# Patient Record
Sex: FEMALE | Race: WHITE | NOT HISPANIC OR LATINO | Employment: FULL TIME | URBAN - NONMETROPOLITAN AREA
[De-identification: names, ages, dates, MRNs, and addresses within clinical notes are randomized per-mention and may not be internally consistent; named-entity substitution may affect disease eponyms.]

---

## 2022-03-08 ENCOUNTER — NURSE TRIAGE (OUTPATIENT)
Dept: CALL CENTER | Facility: HOSPITAL | Age: 47
End: 2022-03-08

## 2022-03-08 NOTE — TELEPHONE ENCOUNTER
HUB    COVID three weeks ago, now palpitations occur during rest, none with exercise.  Just wanting to make an appointment for a check up.    Reason for Disposition  • [1] Skipped or extra beat(s) AND [2] occurs < 4 times / minute    Additional Information  • Negative: Passed out (i.e., lost consciousness, collapsed and was not responding)  • Negative: Shock suspected (e.g., cold/pale/clammy skin, too weak to stand, low BP, rapid pulse)  • Negative: Difficult to awaken or acting confused (e.g., disoriented, slurred speech)  • Negative: Visible sweat on face or sweat dripping down face  • Negative: Unable to walk, or can only walk with assistance (e.g., requires support)  • Negative: [1] Received SHOCK from implantable cardiac defibrillator AND [2] persisting symptoms (i.e., palpitations, lightheadedness)  • Negative: [1] Dizziness, lightheadedness, or weakness AND [2] heart beating very rapidly (e.g., > 140 / minute)  • Negative: [1] Dizziness, lightheadedness, or weakness AND [2] heart beating very slowly  (e.g., < 50 / minute)  • Negative: Sounds like a life-threatening emergency to the triager  • Negative: Chest pain  • Negative: Implantable Cardiac Defibrillator (ICD) or a pacemaker symptoms or questions  • Negative: Difficulty breathing  • Negative: Dizziness, lightheadedness, or weakness  • Negative: [1] Heart beating very rapidly (e.g., > 140 / minute) AND [2] present now  (Exception: during exercise)  • Negative: Heart beating very slowly (e.g., < 50 / minute)  (Exception: athlete and heart rate normal for caller)  • Negative: New or worsened shortness of breath with activity (dyspnea on exertion)  • Negative: Patient sounds very sick or weak to the triager  • Negative: [1] Heart beating very rapidly (e.g., > 140 / minute) AND [2] not present now  (Exception: during exercise)  • Negative: [1] Skipped or extra beat(s) AND [2] increases with exercise or exertion  • Negative: [1] Skipped or extra beat(s) AND  "[2] occurs 4 or more times per minute  • Negative: New or worsened ankle swelling  • Negative: History of heart disease  (i.e., heart attack, bypass surgery, angina, angioplasty, CHF) (Exception: brief heartbeat symptoms that went away and now feels well)  • Negative: Age > 60 years (Exception: brief heartbeat symptoms that went away and now feels well)  • Negative: Taking water pill (i.e., diuretic) or heart medication (e.g., digoxin)  • Negative: Wearing a \"Holter monitor\" or \"cardiac event monitor\"  • Negative: [1] Received SHOCK from implantable cardiac defibrillator AND [2] now feels well  • Negative: Heart rhythm alert (e.g., \"you have irregular heartbeat\") from personal wearable device (e.g., Apple Watch)  • Negative: History of hyperthyroidism or taking thyroid medication  • Negative: Substance use (drug use) or misuse, known or suspected  • Negative: [1] ADHD AND [2] taking stimulant medication  • Negative: [1] Palpitations AND [2] no improvement after using CARE ADVICE  • Negative: Problems with anxiety or stress  • Negative: Palpitations are a chronic symptom (recurrent or ongoing AND present > 4 weeks)    Answer Assessment - Initial Assessment Questions  1. DESCRIPTION: \"Please describe your heart rate or heartbeat that you are having\" (e.g., fast/slow, regular/irregular, skipped or extra beats, \"palpitations\")      Normal at this time; palpitations earlier today, maybe 3 times today; best described as a hiccup  2. ONSET: \"When did it start?\" (Minutes, hours or days)       First occurrence last week  3. DURATION: \"How long does it last\" (e.g., seconds, minutes, hours)      None at this time; lasts for just a few seconds when it occurs  4. PATTERN \"Does it come and go, or has it been constant since it started?\"  \"Does it get worse with exertion?\"   \"Are you feeling it now?\"      Come and go  5. TAP: \"Using your hand, can you tap out what you are feeling on a chair or table in front of you, so that I can " "hear?\" (Note: not all patients can do this)        normal  6. HEART RATE: \"Can you tell me your heart rate?\" \"How many beats in 15 seconds?\"  (Note: not all patients can do this)        78 and normal  7. RECURRENT SYMPTOM: \"Have you ever had this before?\" If Yes, ask: \"When was the last time?\" and \"What happened that time?\"       Last time was about an hour ago  8. CAUSE: \"What do you think is causing the palpitations?\"      covid three weeks ago  9. CARDIAC HISTORY: \"Do you have any history of heart disease?\" (e.g., heart attack, angina, bypass surgery, angioplasty, arrhythmia)       no  10. OTHER SYMPTOMS: \"Do you have any other symptoms?\" (e.g., dizziness, chest pain, sweating, difficulty breathing)        no  11. PREGNANCY: \"Is there any chance you are pregnant?\" \"When was your last menstrual period?\"        na    Protocols used: HEART RATE AND HEARTBEAT QUESTIONS-ADULT-AH      "

## 2022-03-10 ENCOUNTER — OFFICE VISIT (OUTPATIENT)
Dept: FAMILY MEDICINE CLINIC | Age: 47
End: 2022-03-10

## 2022-03-10 VITALS
SYSTOLIC BLOOD PRESSURE: 126 MMHG | HEART RATE: 81 BPM | DIASTOLIC BLOOD PRESSURE: 66 MMHG | TEMPERATURE: 98.7 F | WEIGHT: 197 LBS

## 2022-03-10 DIAGNOSIS — R00.2 PALPITATIONS: Primary | ICD-10-CM

## 2022-03-10 DIAGNOSIS — Z13.220 LIPID SCREENING: ICD-10-CM

## 2022-03-10 DIAGNOSIS — Z86.16 HISTORY OF COVID-19: ICD-10-CM

## 2022-03-10 DIAGNOSIS — N92.0 MENORRHAGIA WITH REGULAR CYCLE: ICD-10-CM

## 2022-03-10 DIAGNOSIS — Z11.59 ENCOUNTER FOR SCREENING FOR OTHER VIRAL DISEASES: ICD-10-CM

## 2022-03-10 PROCEDURE — 93000 ELECTROCARDIOGRAM COMPLETE: CPT | Performed by: FAMILY MEDICINE

## 2022-03-10 PROCEDURE — 99204 OFFICE O/P NEW MOD 45 MIN: CPT | Performed by: NURSE PRACTITIONER

## 2022-03-10 RX ORDER — MULTIPLE VITAMINS W/ MINERALS TAB 9MG-400MCG
1 TAB ORAL DAILY
COMMUNITY

## 2022-03-10 RX ORDER — ALBUTEROL SULFATE 90 UG/1
2 AEROSOL, METERED RESPIRATORY (INHALATION) EVERY 4 HOURS PRN
Qty: 18 G | Refills: 1 | Status: SHIPPED | OUTPATIENT
Start: 2022-03-10

## 2022-03-10 NOTE — PROGRESS NOTES
ECG 12 Lead    Date/Time: 3/10/2022 9:49 AM  Performed by: Onesimo Mcgill MD  Authorized by: Harper Key APRN   Comparison: not compared with previous ECG   Previous ECG: no previous ECG available  Rhythm: sinus rhythm  Rate: normal  BPM: 69  Conduction: conduction normal  ST Segments: ST segments normal  T Waves: T waves normal  QRS axis: normal    Clinical impression: normal ECG

## 2022-03-10 NOTE — PROGRESS NOTES
Chief Complaint  Establish Care (Covid 3 weeks ago, having heart palpitations x 2 weeks)    Subjective          [facility] to [facility] care and discuss palpitations.         Family history of MI, DM and CVA.  The patient has never smoked. She drinks 4 or 5 drinks per week, but she does not drink at all some weeks. She denies using any illegal substances. She is  and has 2 children. 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     She is . She still has menstrual cycles. She states that she is not using any birth control, but her  had a vasectomy. She mentions she has a regular menstrual cycle and notes that the length between her cycles is becoming shorter to 22 to 26 days. She reports having heavy menstrual flow in the first 2 or 3 days of her menstrual cycle before it becomes lighter. She notes that she sometimes needs to change her tampon every half an hour. She adds she bleeds through an ultra tampon and pad within a couple of hours. She denies having dizziness and fatigue. She notes she already discussed about an endometrial ablation with gynecologist. She states that she is up to date on her mammogram.     She states that she had symptoms of COVID on 2022 and tested positive for COVID on the following day. She reports that she began to have episodes of heart palpitations in 2nd week.  She notes that she sometimes feels like having a strong pulse in her neck that gives her an urge to cough, but then she feels better. She states that she has been experiencing episodes of heart palpitation more frequently in the last couple of days, both at rest and with activity.She drinks 1 cup of coffee every day and does drink soft drinks occasionally.  The patient complains of having mild shortness of breath to the point that she needs to take a deep breath. She denies having an albuterol inhaler.      Review of  Systems     A review of systems was performed, and the pertinent positives are noted in the HPI.         Objective   Vital Signs:   /66 (BP Location: Right arm, Patient Position: Sitting, Cuff Size: Adult)   Pulse 81   Temp 98.7 °F (37.1 °C) (Oral)   Wt 89.4 kg (197 lb)       Physical Exam  Vitals reviewed.   Constitutional:       General: She is not in acute distress.     Appearance: Normal appearance. She is well-developed. She is not ill-appearing.   HENT:      Head: Normocephalic and atraumatic.   Eyes:      Conjunctiva/sclera: Conjunctivae normal.      Pupils: Pupils are equal, round, and reactive to light.   Neck:      Vascular: No carotid bruit.   Cardiovascular:      Rate and Rhythm: Normal rate and regular rhythm.      Heart sounds: Normal heart sounds. No murmur heard.  Pulmonary:      Effort: Pulmonary effort is normal. No respiratory distress.      Breath sounds: Normal breath sounds.   Skin:     General: Skin is warm and dry.   Neurological:      Mental Status: She is alert and oriented to person, place, and time.   Psychiatric:         Mood and Affect: Mood and affect normal.         Behavior: Behavior normal.         Thought Content: Thought content normal.         Judgment: Judgment normal.              Result Review :                Assessment and Plan    Diagnoses and all orders for this visit:    1. Palpitations (Primary)  Assessment & Plan:  Ordered Holter monitor. ECG normal .     Orders:  -     TSH Rfx On Abnormal To Free T4; Future  -     Comprehensive Metabolic Panel; Future  -     ECG 12 Lead  -     Holter Monitor - 48 Hour; Future    2. Menorrhagia with regular cycle  Assessment & Plan:  F/u with gynecology as scheduled. Will notify pt if anemic and treat if needed.     Orders:  -     CBC Auto Differential; Future    3. Encounter for screening for other viral diseases  Comments:  Will notify pt of results.   Orders:  -     Hepatitis C Antibody; Future    4. Lipid  screening  Comments:  Will notify pt of results and treat accordingly.   Orders:  -     Lipid Panel; Future    5. History of COVID-19  Comments:  Patient is still experiencing some mild shortness of air.  Albuterol as needed.  Potential side effects medication discussed.    Other orders  -     albuterol sulfate  (90 Base) MCG/ACT inhaler; Inhale 2 puffs Every 4 (Four) Hours As Needed for Wheezing.  Dispense: 18 g; Refill: 1      Patient to notify office with any acute concerns or issues.  Patient verbalizes understanding, agrees with plan of care and has no further questions upon discharge.     Please note that portions of this note were completed with a voice recognition program.        Follow Up    Return in about 1 year (around 3/10/2023) for Annual physical.  Patient was given instructions and counseling regarding her condition or for health maintenance advice. Please see specific information pulled into the AVS if appropriate.       This note has been created using Dragon [employer] and was completed in the EHR by Tonya Land.  KYMBERLY Faith

## 2022-03-18 ENCOUNTER — LAB (OUTPATIENT)
Dept: LAB | Facility: HOSPITAL | Age: 47
End: 2022-03-18

## 2022-03-18 DIAGNOSIS — Z11.59 ENCOUNTER FOR SCREENING FOR OTHER VIRAL DISEASES: ICD-10-CM

## 2022-03-18 DIAGNOSIS — R00.2 PALPITATIONS: ICD-10-CM

## 2022-03-18 DIAGNOSIS — N92.0 MENORRHAGIA WITH REGULAR CYCLE: ICD-10-CM

## 2022-03-18 DIAGNOSIS — Z13.220 LIPID SCREENING: ICD-10-CM

## 2022-03-18 LAB
ALBUMIN SERPL-MCNC: 4 G/DL (ref 3.5–5.2)
ALBUMIN/GLOB SERPL: 1.3 G/DL
ALP SERPL-CCNC: 72 U/L (ref 39–117)
ALT SERPL W P-5'-P-CCNC: 11 U/L (ref 1–33)
ANION GAP SERPL CALCULATED.3IONS-SCNC: 9.6 MMOL/L (ref 5–15)
AST SERPL-CCNC: 14 U/L (ref 1–32)
BASOPHILS # BLD AUTO: 0.03 10*3/MM3 (ref 0–0.2)
BASOPHILS NFR BLD AUTO: 0.5 % (ref 0–1.5)
BILIRUB SERPL-MCNC: 0.3 MG/DL (ref 0–1.2)
BUN SERPL-MCNC: 9 MG/DL (ref 6–20)
BUN/CREAT SERPL: 12.5 (ref 7–25)
CALCIUM SPEC-SCNC: 8.9 MG/DL (ref 8.6–10.5)
CHLORIDE SERPL-SCNC: 104 MMOL/L (ref 98–107)
CHOLEST SERPL-MCNC: 158 MG/DL (ref 0–200)
CO2 SERPL-SCNC: 25.4 MMOL/L (ref 22–29)
CREAT SERPL-MCNC: 0.72 MG/DL (ref 0.57–1)
DEPRECATED RDW RBC AUTO: 45.3 FL (ref 37–54)
EGFRCR SERPLBLD CKD-EPI 2021: 103.9 ML/MIN/1.73
EOSINOPHIL # BLD AUTO: 0.05 10*3/MM3 (ref 0–0.4)
EOSINOPHIL NFR BLD AUTO: 0.9 % (ref 0.3–6.2)
ERYTHROCYTE [DISTWIDTH] IN BLOOD BY AUTOMATED COUNT: 12.7 % (ref 12.3–15.4)
GLOBULIN UR ELPH-MCNC: 3 GM/DL
GLUCOSE SERPL-MCNC: 92 MG/DL (ref 65–99)
HCT VFR BLD AUTO: 37.6 % (ref 34–46.6)
HCV AB SER DONR QL: NORMAL
HDLC SERPL-MCNC: 40 MG/DL (ref 40–60)
HGB BLD-MCNC: 12.1 G/DL (ref 12–15.9)
IMM GRANULOCYTES # BLD AUTO: 0 10*3/MM3 (ref 0–0.05)
IMM GRANULOCYTES NFR BLD AUTO: 0 % (ref 0–0.5)
LDLC SERPL CALC-MCNC: 99 MG/DL (ref 0–100)
LDLC/HDLC SERPL: 2.45 {RATIO}
LYMPHOCYTES # BLD AUTO: 1.95 10*3/MM3 (ref 0.7–3.1)
LYMPHOCYTES NFR BLD AUTO: 34.1 % (ref 19.6–45.3)
MCH RBC QN AUTO: 30.7 PG (ref 26.6–33)
MCHC RBC AUTO-ENTMCNC: 32.2 G/DL (ref 31.5–35.7)
MCV RBC AUTO: 95.4 FL (ref 79–97)
MONOCYTES # BLD AUTO: 0.54 10*3/MM3 (ref 0.1–0.9)
MONOCYTES NFR BLD AUTO: 9.4 % (ref 5–12)
NEUTROPHILS NFR BLD AUTO: 3.15 10*3/MM3 (ref 1.7–7)
NEUTROPHILS NFR BLD AUTO: 55.1 % (ref 42.7–76)
PLATELET # BLD AUTO: 260 10*3/MM3 (ref 140–450)
PMV BLD AUTO: 10.7 FL (ref 6–12)
POTASSIUM SERPL-SCNC: 4.2 MMOL/L (ref 3.5–5.2)
PROT SERPL-MCNC: 7 G/DL (ref 6–8.5)
RBC # BLD AUTO: 3.94 10*6/MM3 (ref 3.77–5.28)
SODIUM SERPL-SCNC: 139 MMOL/L (ref 136–145)
TRIGL SERPL-MCNC: 100 MG/DL (ref 0–150)
TSH SERPL DL<=0.05 MIU/L-ACNC: 1.68 UIU/ML (ref 0.27–4.2)
VLDLC SERPL-MCNC: 19 MG/DL (ref 5–40)
WBC NRBC COR # BLD: 5.72 10*3/MM3 (ref 3.4–10.8)

## 2022-03-18 PROCEDURE — 86803 HEPATITIS C AB TEST: CPT

## 2022-03-18 PROCEDURE — 80061 LIPID PANEL: CPT

## 2022-03-18 PROCEDURE — 80050 GENERAL HEALTH PANEL: CPT

## 2022-03-18 PROCEDURE — 36415 COLL VENOUS BLD VENIPUNCTURE: CPT

## 2022-04-24 ENCOUNTER — HOSPITAL ENCOUNTER
Dept: HOSPITAL 49 - FER | Age: 47
LOS: 1 days | Discharge: HOME | End: 2022-04-25
Attending: INTERNAL MEDICINE | Admitting: INTERNAL MEDICINE
Payer: COMMERCIAL

## 2022-04-24 VITALS — BODY MASS INDEX: 30.65 KG/M2 | HEIGHT: 67.99 IN | WEIGHT: 202.25 LBS

## 2022-04-24 DIAGNOSIS — Z86.16: ICD-10-CM

## 2022-04-24 DIAGNOSIS — K52.9: Primary | ICD-10-CM

## 2022-04-24 DIAGNOSIS — Z72.89: ICD-10-CM

## 2022-04-24 LAB
ALBUMIN SERPL-MCNC: 4 G/DL (ref 3.4–5)
ALKALINE PHOSHATASE: 88 U/L (ref 46–116)
ALT SERPL-CCNC: 22 U/L (ref 14–59)
AST: 13 U/L (ref 15–37)
BACTERIA: (no result)
BASOPHIL: 0.1 % (ref 0–2)
BASOPHIL: 0.3 % (ref 0–2)
BILIRUBIN - TOTAL: 0.3 MG/DL (ref 0.2–1)
BILIRUBIN: NEGATIVE MG/DL
BLOOD: (no result) ERY/UL
BUN SERPL-MCNC: 14 MG/DL (ref 7–18)
BUN/CREAT RATIO (CALC): 19.2 RATIO
CHLORIDE: 99 MMOL/L (ref 98–107)
CLARITY UR: CLEAR
CO2 (BICARBONATE): 24 MMOL/L (ref 21–32)
COLOR: YELLOW
CREATININE: 0.73 MG/DL (ref 0.51–0.95)
EOSINOPHIL: 0 % (ref 0–5)
EOSINOPHIL: 0.1 % (ref 0–5)
GLOBULIN (CALCULATION): 4.2 G/DL
GLUCOSE (U): NORMAL MG/DL
GLUCOSE SERPL-MCNC: 131 MG/DL (ref 74–106)
HCT: 38.2 % (ref 37–47)
HCT: 39.6 % (ref 37–47)
HGB BLD-MCNC: 12.7 G/DL (ref 12.5–16)
HGB BLD-MCNC: 13.5 G/DL (ref 12.5–16)
LACTIC ACID: 3 MMOL/L (ref 0.4–1.9)
LEUKOCYTES: NEGATIVE LEU/UL
LIPASE: 80 U/L (ref 73–393)
LYMPHOCYTE: 16.4 % (ref 15–48)
LYMPHOCYTE: 7.3 % (ref 15–48)
MCH RBC QN AUTO: 31.1 PG (ref 25–31)
MCH RBC QN AUTO: 31.4 PG (ref 25–31)
MCHC RBC AUTO-ENTMCNC: 33.2 G/DL (ref 32–36)
MCHC RBC AUTO-ENTMCNC: 34.1 G/DL (ref 32–36)
MCV: 92.1 FL (ref 78–100)
MCV: 93.6 FL (ref 78–100)
MONOCYTE: 3.9 % (ref 0–12)
MONOCYTE: 7.3 % (ref 0–12)
MPV: 10.6 FL (ref 6–9.5)
MPV: 10.7 FL (ref 6–9.5)
NEUTROPHIL: 75.5 % (ref 41–80)
NEUTROPHIL: 88.1 % (ref 41–80)
NITRITE: NEGATIVE MG/DL
NRBC: 0
NRBC: 0
PLT: 298 K/UL (ref 150–400)
PLT: 328 K/UL (ref 150–400)
POTASSIUM: 3.5 MMOL/L (ref 3.5–5.1)
PROTEIN: NEGATIVE MG/DL
RBC MORPHOLOGY: NORMAL
RBC MORPHOLOGY: NORMAL
RBC: 4.08 M/UL (ref 4.2–5.4)
RBC: 4.3 M/UL (ref 4.2–5.4)
RDW: 13 % (ref 11.5–14)
RDW: 13.1 % (ref 11.5–14)
SPECIFIC GRAVITY: 1.01 (ref 1–1.03)
SQUAMOUS EPITHELIAL CELLS: (no result)
TOTAL PROTEIN: 8.2 G/DL (ref 6.4–8.2)
URINARY RBC: (no result)
URINARY WBC: (no result)
UROBILINOGEN: 0.2 MG/DL (ref 0.2–1)
WBC: 13.6 K/UL (ref 4–10.5)
WBC: 13.7 K/UL (ref 4–10.5)

## 2022-04-24 PROCEDURE — U0002 COVID-19 LAB TEST NON-CDC: HCPCS

## 2022-04-24 PROCEDURE — C9113 INJ PANTOPRAZOLE SODIUM, VIA: HCPCS

## 2022-04-24 PROCEDURE — G0378 HOSPITAL OBSERVATION PER HR: HCPCS

## 2022-04-25 LAB
ALBUMIN SERPL-MCNC: 3.2 G/DL (ref 3.4–5)
ALKALINE PHOSHATASE: 60 U/L (ref 46–116)
ALT SERPL-CCNC: 12 U/L (ref 14–59)
AST: 11 U/L (ref 15–37)
BASOPHIL: 0.2 % (ref 0–2)
BILIRUBIN - TOTAL: 0.4 MG/DL (ref 0.2–1)
BUN SERPL-MCNC: 7 MG/DL (ref 7–18)
BUN/CREAT RATIO (CALC): 11.9 RATIO
C-REACTIVE PROTEIN: < 0.2 MG/DL (ref ?–0.9)
CHLORIDE: 107 MMOL/L (ref 98–107)
CO2 (BICARBONATE): 23 MMOL/L (ref 21–32)
CREATININE: 0.59 MG/DL (ref 0.51–0.95)
EOSINOPHIL(M): 1 % (ref 0–5)
EOSINOPHIL: 0.3 % (ref 0–5)
GLOBULIN (CALCULATION): 3.1 G/DL
GLUCOSE SERPL-MCNC: 92 MG/DL (ref 74–106)
HCT: 36.9 % (ref 37–47)
HGB BLD-MCNC: 12.1 G/DL (ref 12.5–16)
INR PPP: 1.15 (ref 0.9–1.2)
LACTIC ACID: 0.6 MMOL/L (ref 0.4–1.9)
LYMPHOCYTE(M): 24 % (ref 15–48)
LYMPHOCYTE: 23.5 % (ref 15–48)
MCH RBC QN AUTO: 31 PG (ref 25–31)
MCHC RBC AUTO-ENTMCNC: 32.8 G/DL (ref 32–36)
MCV: 94.6 FL (ref 78–100)
MONOCYTE(M): 8 % (ref 0–12)
MONOCYTE: 8.3 % (ref 0–12)
MPV: 10.8 FL (ref 6–9.5)
NEUTROPHIL: 67.4 % (ref 41–80)
NEUTROPHILS(M): 66 % (ref 41–80)
NRBC: 0
PLATELET ESTIMATE: NORMAL
PLATELET MORPHOLOGY: NORMAL
PLT: 266 K/UL (ref 150–400)
POTASSIUM: 3.8 MMOL/L (ref 3.5–5.1)
PROTHROMBIN TIME: 14.1 SECONDS (ref 11.8–13.4)
RBC MORPHOLOGY: NORMAL
RBC: 3.9 M/UL (ref 4.2–5.4)
RDW: 13.6 % (ref 11.5–14)
TOTAL CELL COUNT: 100
TOTAL PROTEIN: 6.3 G/DL (ref 6.4–8.2)
VARIANT LYMPHOCYTE: 1
WBC: 9.6 K/UL (ref 4–10.5)

## 2022-06-17 ENCOUNTER — HOSPITAL ENCOUNTER (OUTPATIENT)
Dept: HOSPITAL 49 - FAS | Age: 47
Discharge: HOME | End: 2022-06-17
Attending: STUDENT IN AN ORGANIZED HEALTH CARE EDUCATION/TRAINING PROGRAM
Payer: COMMERCIAL

## 2022-06-17 VITALS — HEIGHT: 68 IN | WEIGHT: 196.23 LBS | BODY MASS INDEX: 29.74 KG/M2

## 2022-06-17 DIAGNOSIS — K52.9: Primary | ICD-10-CM

## 2022-09-02 ENCOUNTER — OFFICE VISIT (OUTPATIENT)
Dept: FAMILY MEDICINE CLINIC | Age: 47
End: 2022-09-02

## 2022-09-02 VITALS
SYSTOLIC BLOOD PRESSURE: 132 MMHG | HEIGHT: 70 IN | DIASTOLIC BLOOD PRESSURE: 83 MMHG | OXYGEN SATURATION: 100 % | WEIGHT: 199 LBS | TEMPERATURE: 98.1 F | HEART RATE: 64 BPM | BODY MASS INDEX: 28.49 KG/M2

## 2022-09-02 DIAGNOSIS — R00.2 PALPITATIONS: Primary | ICD-10-CM

## 2022-09-02 DIAGNOSIS — B07.9 WART OF HAND: ICD-10-CM

## 2022-09-02 PROCEDURE — 99213 OFFICE O/P EST LOW 20 MIN: CPT | Performed by: NURSE PRACTITIONER

## 2022-09-02 RX ORDER — BACILLUS COAGULANS/INULIN 1B-250 MG
CAPSULE ORAL EVERY OTHER DAY
COMMUNITY

## 2022-09-09 ENCOUNTER — PATIENT MESSAGE (OUTPATIENT)
Dept: FAMILY MEDICINE CLINIC | Age: 47
End: 2022-09-09

## 2022-09-09 NOTE — PROGRESS NOTES
"Chief Complaint  Palpitations (Having since feb after covid. Has worn a heart monitor and ekg abd taken inhaler but no improvements. Wants to be referred to cardio ), Shortness of Breath, and wart  (Wart on right hand 3rd finger wants wart removed )    Subjective          Onelia Chauhan presents to [facility]  Is here today for continued palpitations after having COVID as well as mildly soa. She has tried the albuterol inhaler and it does help some. Holter monitor and ecg were normal. Denies excessive caffeine use. They have still continued and she would like further evaluation. She also is c/o very small wart to her 3rd finger of right hand that she would like removed. She has had multiple warts in the past and wanted to do it before it got to large.     Objective   Vital Signs:   Vitals:    09/02/22 1149   BP: 132/83   BP Location: Left arm   Patient Position: Sitting   Cuff Size: Adult   Pulse: 64   Temp: 98.1 °F (36.7 °C)   TempSrc: Oral   SpO2: 100%   Weight: 90.3 kg (199 lb)   Height: 177.8 cm (70\")       Physical Exam  Vitals reviewed.   Constitutional:       General: She is not in acute distress.     Appearance: Normal appearance. She is well-developed.   HENT:      Head: Normocephalic and atraumatic.   Eyes:      Conjunctiva/sclera: Conjunctivae normal.      Pupils: Pupils are equal, round, and reactive to light.   Neck:      Vascular: No carotid bruit.   Cardiovascular:      Rate and Rhythm: Normal rate and regular rhythm.      Heart sounds: Normal heart sounds. No murmur heard.  Pulmonary:      Effort: Pulmonary effort is normal. No respiratory distress.      Breath sounds: Normal breath sounds.   Skin:     General: Skin is warm and dry.      Comments: Very small wart to lateral aspect of 3rd digit of right hand noted.    Neurological:      Mental Status: She is alert and oriented to person, place, and time.   Psychiatric:         Mood and Affect: Mood and affect " normal.         Behavior: Behavior normal.         Thought Content: Thought content normal.         Judgment: Judgment normal.          Result Review :       Cryotherapy, Skin Lesion    Date/Time: 9/9/2022 5:46 PM  Performed by: Harper Key APRN  Authorized by: Harper Key APRN   Preparation: Patient was prepped and draped in the usual sterile fashion.  Local anesthesia used: no    Anesthesia:  Local anesthesia used: no    Sedation:  Patient sedated: no    Patient tolerance: patient tolerated the procedure well with no immediate complications            Assessment and Plan    Diagnoses and all orders for this visit:    1. Palpitations (Primary)  -     Ambulatory Referral to Cardiology    2. Wart of hand  Comments:  cryotherapy tolerated well. Explained to pt that she may need to return for 2-3 treatments.     Other orders  -     Cryotherapy, Skin Lesion    Patient to notify office with any acute concerns or issues.  Patient verbalizes understanding, agrees with plan of care and has no further questions upon discharge.    Please note that portions of this note were completed with a voice recognition program.    Follow Up {  Wrapup  LOS  Follow-up  Instructions  Communications :23}   Return in about 3 months (around 12/2/2022) for Recheck.  Patient was given instructions and counseling regarding her condition or for health maintenance advice. Please see specific information pulled into the AVS if appropriate.

## 2022-09-14 ENCOUNTER — OFFICE VISIT (OUTPATIENT)
Dept: CARDIOLOGY | Facility: CLINIC | Age: 47
End: 2022-09-14

## 2022-09-14 VITALS
DIASTOLIC BLOOD PRESSURE: 59 MMHG | HEIGHT: 70 IN | SYSTOLIC BLOOD PRESSURE: 127 MMHG | WEIGHT: 196.8 LBS | OXYGEN SATURATION: 100 % | HEART RATE: 60 BPM | BODY MASS INDEX: 28.18 KG/M2

## 2022-09-14 DIAGNOSIS — Z72.0 TOBACCO ABUSE: ICD-10-CM

## 2022-09-14 DIAGNOSIS — R00.2 INTERMITTENT PALPITATIONS: Primary | ICD-10-CM

## 2022-09-14 PROCEDURE — 99203 OFFICE O/P NEW LOW 30 MIN: CPT | Performed by: INTERNAL MEDICINE

## 2022-09-14 NOTE — PROGRESS NOTES
Chief Complaint  Establish Care (Pt c/o palpitations when she is resting, SOB and chest tightness since February.)    Subjective            Onelia Chauhan presents to [facility]  History of Present Illness  Mrs. Chauhan is a new patient who was referred here by her PCP due to recurrent episodes of intermittent palpitations since February 2022.  She reports that she had COVID-19 infection early this year and has experienced numerous episodes of palpitations since that time.  These episodes tend to occur more frequently in the evening and often occur while she is resting at home.  No associated lightheadedness, chest pain/pressure, or nausea reported.  She has not experienced any episodes of palpitations while physically exerting herself.  She had a 24 hour Holter monitor in March 2022 that showed sinus rhythm with rare PVCs and PACs, but no significant abnormalities.  Her reported symptoms while wearing the monitor consistently correlated with normal sinus rhythm, and she states she did experience multiple episodes of her typical palpitations while wearing the Holter monitor.  Mrs. Chauhan cannot identify any triggers or precipitating factors for these episodes and states that they consistently resolve spontaneously within several minutes or less.  She does report some mild chronic exertional dyspnea, but states this symptom has improved since she was recently started on an inhaler by her PCP.  Her last ECG obtained back in March 2022 showed sinus rhythm and was normal.  Her BP was normal at 127/59 in the office today.  No orthopnea, PND, or peripheral edema reported.  She likewise does not report any family history of arrhythmias.      Past Medical History:   Diagnosis Date   • Asthma February 2022    Short of breath  after covid       No past surgical history on file.    Social History     Tobacco Use   • Smoking status: Current Every Day Smoker   • Smokeless tobacco: Never Used  "  Vaping Use   • Vaping Use: Never used   Substance Use Topics   • Alcohol use: Not Currently   • Drug use: Never       Family History   Problem Relation Age of Onset   • Diabetes Father    • Stroke Maternal Grandmother    • Heart attack Maternal Grandmother    • Stroke Paternal Grandmother    • Diabetes Paternal Grandmother    • Diabetes Paternal Grandfather         Current Outpatient Medications on File Prior to Visit   Medication Sig   • albuterol sulfate  (90 Base) MCG/ACT inhaler Inhale 2 puffs Every 4 (Four) Hours As Needed for Wheezing.   • Bacillus Coagulans-Inulin (Probiotic) 1-250 BILLION-MG capsule Take  by mouth.   • multivitamin with minerals tablet tablet Take 1 tablet by mouth Daily.     No current facility-administered medications on file prior to visit.       No Known Allergies    Review of Systems   Constitutional: Negative for activity change, chills, fever and unexpected weight gain.   HENT: Negative for hearing loss, nosebleeds and sore throat.    Eyes: Negative for pain and visual disturbance.   Respiratory: Positive for shortness of breath. Negative for cough and wheezing.    Cardiovascular: Positive for palpitations. Negative for chest pain and leg swelling.   Gastrointestinal: Negative for abdominal pain, blood in stool and vomiting.   Endocrine: Negative for cold intolerance and heat intolerance.   Genitourinary: Negative for dysuria and hematuria.   Musculoskeletal: Negative for joint swelling and myalgias.   Skin: Negative for color change, pallor and rash.   Neurological: Negative for tremors, syncope, weakness, light-headedness and headache.   Hematological: Negative for adenopathy. Does not bruise/bleed easily.        Objective     /59   Pulse 60   Ht 177.8 cm (70\")   Wt 89.3 kg (196 lb 12.8 oz)   SpO2 100%   BMI 28.24 kg/m²       Physical Exam  Constitutional:       General: She is not in acute distress.     Appearance: Normal appearance.   HENT:      Head: " Atraumatic.      Mouth/Throat:      Mouth: Mucous membranes are moist.      Pharynx: Oropharynx is clear. No oropharyngeal exudate.   Eyes:      General: No scleral icterus.     Conjunctiva/sclera: Conjunctivae normal.   Neck:      Vascular: No carotid bruit or JVD.   Cardiovascular:      Rate and Rhythm: Normal rate and regular rhythm.  No extrasystoles are present.     Chest Wall: PMI is not displaced.      Pulses:           Radial pulses are 2+ on the right side and 2+ on the left side.      Heart sounds: S1 normal and S2 normal. No murmur heard.    No friction rub. No gallop. No S3 sounds.   Pulmonary:      Effort: Pulmonary effort is normal. No tachypnea or respiratory distress.      Breath sounds: No decreased breath sounds, wheezing, rhonchi or rales.   Chest:      Chest wall: No tenderness.   Abdominal:      General: Bowel sounds are normal. There is no distension.      Palpations: Abdomen is soft. There is no mass.      Tenderness: There is no abdominal tenderness.   Musculoskeletal:         General: No swelling, tenderness or deformity.      Cervical back: Neck supple. No tenderness.      Right lower leg: No edema.      Left lower leg: No edema.   Skin:     General: Skin is warm and dry.      Coloration: Skin is not jaundiced.      Findings: No erythema or rash.      Nails: There is no clubbing.   Neurological:      General: No focal deficit present.      Mental Status: She is alert and oriented to person, place, and time.      Motor: No weakness.   Psychiatric:         Mood and Affect: Mood normal.         Behavior: Behavior normal.       Result Review :     The following data was reviewed by: Doc Page MD on 09/14/2022:    CMP    CMP 3/18/22   Glucose 92   BUN 9   Creatinine 0.72   Sodium 139   Potassium 4.2   Chloride 104   Calcium 8.9   Albumin 4.00   Total Bilirubin 0.3   Alkaline Phosphatase 72   AST (SGOT) 14   ALT (SGPT) 11           CBC    CBC 3/18/22   WBC 5.72   RBC 3.94   Hemoglobin  12.1   Hematocrit 37.6   MCV 95.4   MCH 30.7   MCHC 32.2   RDW 12.7   Platelets 260           Lipid Panel    Lipid Panel 3/18/22   Total Cholesterol 158   Triglycerides 100   HDL Cholesterol 40   VLDL Cholesterol 19   LDL Cholesterol  99   LDL/HDL Ratio 2.45           Holter Monitor 3/10/22:  · A normal monitor study.  · No correlation of arrhythmias with patient symptoms.  Study Findings:  Palpitations and shortness of breath were reported during the monitoring period. Palpitations had no correlations. The predominant rhythm noted during the testing period was sinus rhythm. Premature atrial contractions occured rarely. There was no evidence of atrial arrhythmias. There were no episodes of supraventricular tachycardia. Premature ventricular contractions occured rarely. There were no episodes of ventricular tachycardia. Sinoatrial node conduction was normal. No atrioventricular block noted.         Assessment and Plan      Diagnoses and all orders for this visit:    1. Intermittent palpitations (Primary)  -     Adult Transthoracic Echo Complete w/ Color, Spectral and Contrast if necessary per protocol; Future    2. Tobacco abuse    -Palpitations:  We we will obtain an echocardiogram to rule out significant structural heart disease, as above.  Her previous Holter monitor was unremarkable and her reported symptoms during that study consistently correlated with normal sinus rhythm.  I offered her a trial of low-dose beta-blocker therapy to see if it helps with her symptoms, but she prefers to avoid additional medications for now.      -Tobacco abuse:  Smoking cessation counseling was provided.        Follow Up     Follow up will be based on the echocardiogram results.    Patient was given instructions and counseling regarding her condition or for health maintenance advice. Please see specific information pulled into the AVS if appropriate.     Onelia Chauhan  reports that she has been smoking. She has never used  smokeless tobacco.  I have educated her on the risk of diseases from using tobacco products such as cancer, COPD and heart disease.     I advised her to quit and she is not ready to quit at this time.

## 2022-10-20 ENCOUNTER — LAB (OUTPATIENT)
Dept: LAB | Facility: HOSPITAL | Age: 47
End: 2022-10-20

## 2022-10-20 ENCOUNTER — OFFICE VISIT (OUTPATIENT)
Dept: FAMILY MEDICINE CLINIC | Age: 47
End: 2022-10-20

## 2022-10-20 VITALS
HEIGHT: 70 IN | DIASTOLIC BLOOD PRESSURE: 76 MMHG | WEIGHT: 199.6 LBS | SYSTOLIC BLOOD PRESSURE: 120 MMHG | BODY MASS INDEX: 28.58 KG/M2 | HEART RATE: 71 BPM | TEMPERATURE: 99.2 F

## 2022-10-20 DIAGNOSIS — Z01.818 PREOP TESTING: ICD-10-CM

## 2022-10-20 DIAGNOSIS — Z01.818 PREOP TESTING: Primary | ICD-10-CM

## 2022-10-20 LAB
ALBUMIN SERPL-MCNC: 4.6 G/DL (ref 3.5–5.2)
ALBUMIN/GLOB SERPL: 1.8 G/DL
ALP SERPL-CCNC: 88 U/L (ref 39–117)
ALT SERPL W P-5'-P-CCNC: 14 U/L (ref 1–33)
ANION GAP SERPL CALCULATED.3IONS-SCNC: 10.7 MMOL/L (ref 5–15)
AST SERPL-CCNC: 19 U/L (ref 1–32)
BASOPHILS # BLD AUTO: 0.02 10*3/MM3 (ref 0–0.2)
BASOPHILS NFR BLD AUTO: 0.3 % (ref 0–1.5)
BILIRUB SERPL-MCNC: 0.4 MG/DL (ref 0–1.2)
BUN SERPL-MCNC: 10 MG/DL (ref 6–20)
BUN/CREAT SERPL: 15.6 (ref 7–25)
CALCIUM SPEC-SCNC: 9.6 MG/DL (ref 8.6–10.5)
CHLORIDE SERPL-SCNC: 103 MMOL/L (ref 98–107)
CO2 SERPL-SCNC: 26.3 MMOL/L (ref 22–29)
CREAT SERPL-MCNC: 0.64 MG/DL (ref 0.57–1)
DEPRECATED RDW RBC AUTO: 45.6 FL (ref 37–54)
EGFRCR SERPLBLD CKD-EPI 2021: 109.8 ML/MIN/1.73
EOSINOPHIL # BLD AUTO: 0.06 10*3/MM3 (ref 0–0.4)
EOSINOPHIL NFR BLD AUTO: 0.8 % (ref 0.3–6.2)
ERYTHROCYTE [DISTWIDTH] IN BLOOD BY AUTOMATED COUNT: 12.9 % (ref 12.3–15.4)
GLOBULIN UR ELPH-MCNC: 2.5 GM/DL
GLUCOSE SERPL-MCNC: 95 MG/DL (ref 65–99)
HCT VFR BLD AUTO: 41 % (ref 34–46.6)
HGB BLD-MCNC: 13.2 G/DL (ref 12–15.9)
IMM GRANULOCYTES # BLD AUTO: 0.01 10*3/MM3 (ref 0–0.05)
IMM GRANULOCYTES NFR BLD AUTO: 0.1 % (ref 0–0.5)
LYMPHOCYTES # BLD AUTO: 2.14 10*3/MM3 (ref 0.7–3.1)
LYMPHOCYTES NFR BLD AUTO: 27.8 % (ref 19.6–45.3)
MCH RBC QN AUTO: 30.8 PG (ref 26.6–33)
MCHC RBC AUTO-ENTMCNC: 32.2 G/DL (ref 31.5–35.7)
MCV RBC AUTO: 95.6 FL (ref 79–97)
MONOCYTES # BLD AUTO: 0.74 10*3/MM3 (ref 0.1–0.9)
MONOCYTES NFR BLD AUTO: 9.6 % (ref 5–12)
NEUTROPHILS NFR BLD AUTO: 4.72 10*3/MM3 (ref 1.7–7)
NEUTROPHILS NFR BLD AUTO: 61.4 % (ref 42.7–76)
PLATELET # BLD AUTO: 262 10*3/MM3 (ref 140–450)
PMV BLD AUTO: 10.7 FL (ref 6–12)
POTASSIUM SERPL-SCNC: 4.9 MMOL/L (ref 3.5–5.2)
PROT SERPL-MCNC: 7.1 G/DL (ref 6–8.5)
RBC # BLD AUTO: 4.29 10*6/MM3 (ref 3.77–5.28)
SODIUM SERPL-SCNC: 140 MMOL/L (ref 136–145)
WBC NRBC COR # BLD: 7.69 10*3/MM3 (ref 3.4–10.8)

## 2022-10-20 PROCEDURE — 93000 ELECTROCARDIOGRAM COMPLETE: CPT | Performed by: NURSE PRACTITIONER

## 2022-10-20 PROCEDURE — 85025 COMPLETE CBC W/AUTO DIFF WBC: CPT

## 2022-10-20 PROCEDURE — 80053 COMPREHEN METABOLIC PANEL: CPT

## 2022-10-20 PROCEDURE — 99213 OFFICE O/P EST LOW 20 MIN: CPT | Performed by: NURSE PRACTITIONER

## 2022-10-20 PROCEDURE — 36415 COLL VENOUS BLD VENIPUNCTURE: CPT

## 2022-10-20 NOTE — PROGRESS NOTES
"Chief Complaint  surgery clearance (Uterine ablation 11/1/22 - needs clearance faxed to [facility] @ [phone])    Subjective          Onelia Chauhan presents to [facility]  For surgical clearance. She is scheduled for uterine ablation 11/1/22 with Dr. Zamorano at [facility]. Pt is saw cardiology for palpitations and has echo scheduled in December. Denies soa, chest pain, sleep apnea. Pt does not smoke.     Objective   Vital Signs:   Vitals:    10/20/22 0855   BP: 120/76   BP Location: Left arm   Patient Position: Sitting   Cuff Size: Adult   Pulse: 71   Temp: 99.2 °F (37.3 °C)   TempSrc: Oral   Weight: 90.5 kg (199 lb 9.6 oz)   Height: 177.8 cm (70\")       Physical Exam  Vitals reviewed.   Constitutional:       General: She is not in acute distress.     Appearance: Normal appearance. She is well-developed.   HENT:      Head: Normocephalic and atraumatic.   Eyes:      Conjunctiva/sclera: Conjunctivae normal.      Pupils: Pupils are equal, round, and reactive to light.   Cardiovascular:      Rate and Rhythm: Normal rate and regular rhythm.      Heart sounds: Normal heart sounds. No murmur heard.  Pulmonary:      Effort: Pulmonary effort is normal. No respiratory distress.      Breath sounds: Normal breath sounds.   Skin:     General: Skin is warm and dry.   Neurological:      Mental Status: She is alert and oriented to person, place, and time.   Psychiatric:         Mood and Affect: Mood and affect normal.         Behavior: Behavior normal.         Thought Content: Thought content normal.         Judgment: Judgment normal.          Result Review :   The following data was reviewed by: KYMBERLY Faith on 10/20/2022:  Office Visit with Doc Page MD (09/14/2022)           Assessment and Plan    Diagnoses and all orders for this visit:    1. Preop testing (Primary)  -     CBC Auto Differential; Future  -     Comprehensive Metabolic Panel; Future  -     ECG 12 Lead    ECG " normal. Cardio note reviewed. Will message cardio to see if they want to see her in office for clearance or if they want to try to get her echo moved up. Pt also wanted me to ask him to amend his note to take out that she was a smoker. Pt states she has never smoked. Will provide surgical clearance once labs have returned and I've heard back from cardio    Patient to notify office with any acute concerns or issues.  Patient verbalizes understanding, agrees with plan of care and has no further questions upon discharge.    Please note that portions of this note were completed with a voice recognition program.      Follow Up    Return if symptoms worsen or fail to improve.  Patient was given instructions and counseling regarding her condition or for health maintenance advice. Please see specific information pulled into the AVS if appropriate.

## 2022-10-20 NOTE — PROGRESS NOTES
ECG 12 Lead    Date/Time: 10/20/2022 12:30 PM  Performed by: Onesimo Mcgill MD  Authorized by: Harper Key APRN   Comparison: compared with previous ECG from 3/10/2022  Similar to previous ECG  Rhythm: sinus rhythm  Rate: normal  BPM: 70  Conduction: conduction normal  ST Segments: ST segments normal  T Waves: T waves normal  QRS axis: normal  Other: no other findings    Clinical impression: normal ECG  Clinical impression comment: This is a normal EKG which is unchanged from most recent tracing.

## 2022-10-28 ENCOUNTER — TELEPHONE (OUTPATIENT)
Dept: CARDIOLOGY | Facility: CLINIC | Age: 47
End: 2022-10-28

## 2022-10-28 NOTE — TELEPHONE ENCOUNTER
Please give surgical clearance for low perioperative cardiovascular risk.  She can continue all of her routine medications.

## 2022-10-28 NOTE — TELEPHONE ENCOUNTER
Procedure: uterine ablation on 11/1/22    Medication Directive:     PMH: palpitation    Last Seen: 09/14/22      ECHO: 10/25/22  Normal transthoracic echocardiogram

## 2023-01-09 ENCOUNTER — OFFICE VISIT (OUTPATIENT)
Dept: FAMILY MEDICINE CLINIC | Age: 48
End: 2023-01-09
Payer: COMMERCIAL

## 2023-01-09 VITALS
DIASTOLIC BLOOD PRESSURE: 75 MMHG | TEMPERATURE: 98.8 F | SYSTOLIC BLOOD PRESSURE: 115 MMHG | HEART RATE: 88 BPM | HEIGHT: 68 IN | WEIGHT: 206.6 LBS | BODY MASS INDEX: 31.31 KG/M2

## 2023-01-09 DIAGNOSIS — I07.1 MILD TRICUSPID REGURGITATION: ICD-10-CM

## 2023-01-09 DIAGNOSIS — I37.1 PULMONARY VALVE INSUFFICIENCY, UNSPECIFIED ETIOLOGY: ICD-10-CM

## 2023-01-09 DIAGNOSIS — R15.1 FECAL SMEARING: ICD-10-CM

## 2023-01-09 DIAGNOSIS — K62.5 RECTAL BLEEDING: Primary | ICD-10-CM

## 2023-01-09 PROCEDURE — 99214 OFFICE O/P EST MOD 30 MIN: CPT | Performed by: NURSE PRACTITIONER

## 2023-01-09 NOTE — PROGRESS NOTES
Chief Complaint  Rectal Bleeding (Noticed blood leaking from anus 01/06. )    Subjective          Onelia Chauhan presents to [facility] c/o rectal bleeding on 1/6/23. It was small amount found in her underwear and was bright red. She was admitted to the hospital in April for \"twisted intestines\" and has some mild anal leakage since then. She had a colonoscopy in May with Dr. Herrera which was normal. States when she exercises in the summer, she will have irritation and erythema surrounding anus.     Also had question about echo. She states that cardio did not discuss results with her. She had trace tricuspid valve and pulmonic valve regurgitation. Overall a normal echo. States her palpitations have improved. Denies chest pain or soa.     Denies fever, chills, nausea vomiting, diarrhea, shortness of air or chest pain.  Patient states she has approximately 2-3 bowel movements a day.  First is formed and the rest are slightly loose.  Denies any bleeding since the 6.  Denies any irritation around the anus at this time.      Objective   Vital Signs:   Vitals:    01/09/23 0756   BP: 115/75   BP Location: Left arm   Patient Position: Sitting   Pulse: 88   Temp: 98.8 °F (37.1 °C)   TempSrc: Oral   Weight: 93.7 kg (206 lb 9.6 oz)   Height: 172.7 cm (67.99\")       Physical Exam  Vitals reviewed.   Constitutional:       General: She is not in acute distress.     Appearance: Normal appearance. She is well-developed.   HENT:      Head: Normocephalic and atraumatic.   Eyes:      Conjunctiva/sclera: Conjunctivae normal.      Pupils: Pupils are equal, round, and reactive to light.   Cardiovascular:      Rate and Rhythm: Normal rate and regular rhythm.      Heart sounds: Normal heart sounds. No murmur heard.  Pulmonary:      Effort: Pulmonary effort is normal. No respiratory distress.      Breath sounds: Normal breath sounds.   Abdominal:      General: Bowel sounds are normal. There is no  distension.      Palpations: Abdomen is soft. There is no mass.      Tenderness: There is no abdominal tenderness. There is no guarding or rebound.      Hernia: No hernia is present.   Genitourinary:     Comments: Deferred    Skin:     General: Skin is warm and dry.   Neurological:      Mental Status: She is alert and oriented to person, place, and time.   Psychiatric:         Mood and Affect: Mood and affect normal.         Behavior: Behavior normal.         Thought Content: Thought content normal.         Judgment: Judgment normal.          Result Review :   The following data was reviewed by: KYMBERLY Faith on 01/09/2023:  Adult Transthoracic Echo Complete w/ Color, Spectral and Contrast if necessary per protocol (10/25/2022 13:20)           Assessment and Plan    Diagnoses and all orders for this visit:    1. Rectal bleeding (Primary)  Comments:  Continue to monitor.  Go to ED with moderate rectal bleeding.  Denies bleeding today.  GI referral initiated.    2. Fecal smearing  Comments:  Especially when exercising, encouraged patient to use barrier cream with zinc.    3. Mild tricuspid regurgitation  Comments:  Will repeat echo in 2 years.  Go to ED with any chest pain or shortness of air.  Notify office if palpitations return or with any concerns.    4. Pulmonary valve insufficiency, unspecified etiology  Comments:  Will repeat echo in 2 years.  Go to ED with any chest pain or shortness of air.  Notify office if palpitations return or with any concerns.    Sending for records at [place] and for colonoscopy. Reminder set to repeat echo in 2 years.    Patient to notify office with any acute concerns or issues.  Patient verbalizes understanding, agrees with plan of care and has no further questions upon discharge.    Please note that portions of this note were completed with a voice recognition program.    Follow Up    Return if symptoms worsen or fail to improve.  Patient was given instructions and counseling  regarding her condition or for health maintenance advice. Please see specific information pulled into the AVS if appropriate.

## 2023-01-18 ENCOUNTER — PATIENT MESSAGE (OUTPATIENT)
Dept: FAMILY MEDICINE CLINIC | Age: 48
End: 2023-01-18
Payer: COMMERCIAL

## 2023-01-18 DIAGNOSIS — K62.5 RECTAL BLEEDING: Primary | ICD-10-CM

## 2023-04-27 ENCOUNTER — OFFICE VISIT (OUTPATIENT)
Dept: GASTROENTEROLOGY | Facility: CLINIC | Age: 48
End: 2023-04-27
Payer: COMMERCIAL

## 2023-04-27 VITALS
WEIGHT: 201.6 LBS | DIASTOLIC BLOOD PRESSURE: 68 MMHG | HEART RATE: 72 BPM | BODY MASS INDEX: 30.55 KG/M2 | HEIGHT: 68 IN | SYSTOLIC BLOOD PRESSURE: 115 MMHG

## 2023-04-27 DIAGNOSIS — R15.1 FECAL SMEARING: Primary | ICD-10-CM

## 2023-04-27 DIAGNOSIS — R19.5 LOOSE STOOLS: ICD-10-CM

## 2023-04-27 NOTE — PROGRESS NOTES
Chief Complaint     Rectal Bleeding and bowel leakage    History of Present Illness     Onelia Chauhan is a 48 y.o. female who presents to [facility] on referral from KYMBERLY Faith for a gastroenterology evaluation of rectal bleeding.      She reports being admitted last April at [facility] for enteritis.  Treated with antibiotics.  States that since that time, her bowel pattern has changed.  Colonoscopy performed in June, 2022 normal with negative terminal ileum and random colon biopsies.  Reports a negative stool for C. difficile following hospitalization.    Since that time she has noticed having up to 3 bowel movements every morning.  Describes stool to range from a #4-6 on the [place] stool chart.  States that throughout the day she notices seepage and will often notice stool when wiping after urination.  Reports perineal irritation that she feels is causing a scant amount of bleeding.      Tried a probiotic but didn't notice improvement.  Felt that it made things worse.       History      Past Medical History:   Diagnosis Date   • Asthma February 2022    Short of breath  after covid       Past Surgical History:   Procedure Laterality Date   • COLONOSCOPY  June 2022       Family History   Problem Relation Age of Onset   • Diabetes Father    • Stroke Maternal Grandmother    • Heart attack Maternal Grandmother    • Stroke Paternal Grandmother    • Diabetes Paternal Grandmother    • Diabetes Paternal Grandfather         Current Medications        Current Outpatient Medications:   •  albuterol sulfate  (90 Base) MCG/ACT inhaler, Inhale 2 puffs Every 4 (Four) Hours As Needed for Wheezing., Disp: 18 g, Rfl: 1  •  multivitamin with minerals tablet tablet, Take 1 tablet by mouth Daily., Disp: , Rfl:      Allergies     No Known Allergies    Social History       Social History     Social History Narrative    /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       Immunizations  "    Immunization:  Immunization History   Administered Date(s) Administered   • COVID-19 (MODERNA) 1st,2nd,3rd Dose Monovalent 02/05/2021, 03/05/2021   • Hepatitis A 05/24/2018          Objective     Objective     Vital Signs:   /68 (BP Location: Right arm, Patient Position: Sitting, Cuff Size: Adult)   Pulse 72   Ht 172.7 cm (67.99\")   Wt 91.4 kg (201 lb 9.6 oz)   BMI 30.66 kg/m²       Physical Exam    Results      Result Review :   The following data was reviewed by: KYMBERLY Trejo on 04/27/2023:    CBC w/diff        10/20/2022    09:40   CBC w/Diff   WBC 7.69     RBC 4.29     Hemoglobin 13.2     Hematocrit 41.0     MCV 95.6     MCH 30.8     MCHC 32.2     RDW 12.9     Platelets 262     Neutrophil Rel % 61.4     Immature Granulocyte Rel % 0.1     Lymphocyte Rel % 27.8     Monocyte Rel % 9.6     Eosinophil Rel % 0.8     Basophil Rel % 0.3       CMP        10/20/2022    09:40   CMP   Glucose 95     BUN 10     Creatinine 0.64     EGFR 109.8     Sodium 140     Potassium 4.9     Chloride 103     Calcium 9.6     Total Protein 7.1     Albumin 4.60     Globulin 2.5     Total Bilirubin 0.4     Alkaline Phosphatase 88     AST (SGOT) 19     ALT (SGPT) 14     Albumin/Globulin Ratio 1.8     BUN/Creatinine Ratio 15.6     Anion Gap 10.7        4/24/2022 CT abdomen pelvis with contrast-normal liver, gallbladder and pancreas.  Within the left hemiabdomen there is a segment of inflamed small bowel.    6/17/2022 colonoscopy-normal colonoscopy.  Random colon biopsy-negative.  Terminal ileum biopsy-normal.           Assessment and Plan        Assessment and Plan    Diagnoses and all orders for this visit:    1. Fecal smearing (Primary)    2. Loose stools      Recommend Citrucel 1 to 2 tablespoons daily.  If no improvement consider colestipol.  Follow Up        Follow Up   Return in about 4 months (around 8/27/2023) for rectal leakage.  Patient was given instructions and counseling regarding her condition or for " health maintenance advice. Please see specific information pulled into the AVS if appropriate.

## 2023-08-29 ENCOUNTER — OFFICE VISIT (OUTPATIENT)
Dept: GASTROENTEROLOGY | Facility: CLINIC | Age: 48
End: 2023-08-29
Payer: COMMERCIAL

## 2023-08-29 VITALS
DIASTOLIC BLOOD PRESSURE: 65 MMHG | SYSTOLIC BLOOD PRESSURE: 117 MMHG | HEART RATE: 67 BPM | HEIGHT: 68 IN | WEIGHT: 206 LBS | BODY MASS INDEX: 31.22 KG/M2

## 2023-08-29 DIAGNOSIS — R19.5 LOOSE STOOLS: ICD-10-CM

## 2023-08-29 DIAGNOSIS — R15.1 FECAL SMEARING: Primary | ICD-10-CM

## 2023-08-29 RX ORDER — MONTELUKAST SODIUM 4 MG/1
1-2 TABLET, CHEWABLE ORAL 2 TIMES DAILY
Qty: 120 TABLET | Refills: 1 | Status: SHIPPED | OUTPATIENT
Start: 2023-08-29

## 2023-08-29 NOTE — PROGRESS NOTES
"Chief Complaint     Rectal Problems    History of Present Illness     Onelia Chauhan is a 48 y.o. female who presents to [facility] for follow-up of fecal smearing and loose stools.      She reports that she started fiber and noted improvement in fecal smearing and rectal irritation.      Her  was recently admitted to the ICU and she was not able to take citracel during his admission.  Noted that stool became looser and smearing/leakage returned.  Rectal irritation is only present when smearing occurs.      She is contuing to have 2-3 bowel movements in the morning.         History      Past Medical History:   Diagnosis Date    Asthma February 2022    Short of breath  after covid     Past Surgical History:   Procedure Laterality Date    COLONOSCOPY  June 2022     Family History   Problem Relation Age of Onset    Diabetes Father     Stroke Maternal Grandmother     Heart attack Maternal Grandmother     Stroke Paternal Grandmother     Diabetes Paternal Grandmother     Diabetes Paternal Grandfather         Current Medications       Current Outpatient Medications:     albuterol sulfate  (90 Base) MCG/ACT inhaler, Inhale 2 puffs Every 4 (Four) Hours As Needed for Wheezing., Disp: 18 g, Rfl: 1    methylcellulose oral powder, Take  by mouth Daily., Disp: , Rfl:     multivitamin with minerals tablet tablet, Take 1 tablet by mouth Daily., Disp: , Rfl:     colestipol (COLESTID) 1 g tablet, Take 1-2 tablets by mouth 2 (Two) Times a Day., Disp: 120 tablet, Rfl: 1     Allergies     No Known Allergies    Social History       Social History     Social History Narrative    /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         Objective       /65 (BP Location: Left arm, Patient Position: Sitting, Cuff Size: Adult)   Pulse 67   Ht 172.7 cm (68\")   Wt 93.4 kg (206 lb)   BMI 31.32 kg/mý       Physical Exam    Results       Result Review :                     Assessment and Plan            "   Diagnoses and all orders for this visit:    1. Fecal smearing (Primary)    2. Loose stools  -     colestipol (COLESTID) 1 g tablet; Take 1-2 tablets by mouth 2 (Two) Times a Day.  Dispense: 120 tablet; Refill: 1      Recommend that she continue daily Citrucel.  Add Colestid 1 tablet daily.  Can titrate dose as needed for loose stools.        Follow Up     Follow Up   Return in about 6 months (around 2/29/2024) for fecal smearing.  Patient was given instructions and counseling regarding her condition or for health maintenance advice. Please see specific information pulled into the AVS if appropriate.

## 2023-10-16 ENCOUNTER — OFFICE VISIT (OUTPATIENT)
Dept: FAMILY MEDICINE CLINIC | Age: 48
End: 2023-10-16
Payer: COMMERCIAL

## 2023-10-16 VITALS
SYSTOLIC BLOOD PRESSURE: 125 MMHG | HEIGHT: 68 IN | HEART RATE: 63 BPM | OXYGEN SATURATION: 98 % | BODY MASS INDEX: 31.67 KG/M2 | DIASTOLIC BLOOD PRESSURE: 74 MMHG | WEIGHT: 209 LBS

## 2023-10-16 DIAGNOSIS — R00.2 PALPITATIONS: ICD-10-CM

## 2023-10-16 DIAGNOSIS — E66.09 CLASS 1 OBESITY DUE TO EXCESS CALORIES WITHOUT SERIOUS COMORBIDITY WITH BODY MASS INDEX (BMI) OF 31.0 TO 31.9 IN ADULT: ICD-10-CM

## 2023-10-16 DIAGNOSIS — R35.89 POLYURIA: ICD-10-CM

## 2023-10-16 DIAGNOSIS — Z00.00 ROUTINE GENERAL MEDICAL EXAMINATION AT A HEALTH CARE FACILITY: Primary | ICD-10-CM

## 2023-10-16 PROBLEM — E66.811 CLASS 1 OBESITY DUE TO EXCESS CALORIES WITHOUT SERIOUS COMORBIDITY WITH BODY MASS INDEX (BMI) OF 31.0 TO 31.9 IN ADULT: Status: ACTIVE | Noted: 2023-10-16

## 2023-10-16 PROBLEM — Z86.16 HISTORY OF COVID-19: Status: RESOLVED | Noted: 2022-03-10 | Resolved: 2023-10-16

## 2023-10-16 PROCEDURE — 99396 PREV VISIT EST AGE 40-64: CPT | Performed by: NURSE PRACTITIONER

## 2023-10-16 PROCEDURE — 90471 IMMUNIZATION ADMIN: CPT | Performed by: NURSE PRACTITIONER

## 2023-10-16 PROCEDURE — 90715 TDAP VACCINE 7 YRS/> IM: CPT | Performed by: NURSE PRACTITIONER

## 2023-10-16 NOTE — ASSESSMENT & PLAN NOTE
Patient's (Body mass index is 31.78 kg/m².) indicates that they are obese (BMI >30) with health conditions that include none . Weight is worsening. BMI  is above average; BMI management plan is completed. We discussed portion control and increasing exercise.  We will review labs and if patient would like to discuss any medically assisted weight loss, she can make a follow-up appointment to discuss options.

## 2023-10-16 NOTE — PROGRESS NOTES
"Chief Complaint  Other (Patient is wanting labs and to be checked for diabetes ) and Annual Exam    Subjective          Onelia Chauhan presents to [facility] for annual exam.  Patient does have a concern about polyuria.  States that she tracked how many times she use the restroom recently and it was 18 times in 24 hours.  Denies incontinence.  She states that she is always very thirsty and drinks at least 2 gallons of water daily.  Patient states that her father is diabetic and is the \"picture of health\". She has gained 10 pounds over the last year and has made a conscious effort to increase routine exercise, cut back on alcohol intake and make healthy food choices.  Patient has had an ablation.  She does not have hot flashes or irritability.  She states she does get palpitations at times and they started at the end of the summer.  Coincidently her  was in the ICU following a saddle block pulmonary embolism x1 week.  She did see cardiology for palpitations in the past and was offered a low-dose beta-blocker, which was declined.  Holter and echo were normal.    Review of Systems   Constitutional:  Negative for fatigue.   HENT:  Negative for hearing loss and trouble swallowing.    Eyes:  Negative for blurred vision.   Respiratory:  Negative for cough and shortness of breath.    Cardiovascular:  Negative for chest pain, palpitations and leg swelling.   Gastrointestinal:  Negative for abdominal pain, constipation, diarrhea, nausea and vomiting.   Genitourinary:  Negative for dysuria, frequency and urgency.   Musculoskeletal:  Negative for myalgias.   Skin:  Negative for color change and rash.   Neurological:  Negative for dizziness, weakness and headache.   Psychiatric/Behavioral:  Negative for sleep disturbance, suicidal ideas and depressed mood. The patient is not nervous/anxious.          Objective   Vital Signs:   Vitals:    10/16/23 1617   BP: 125/74   BP Location: Left " "arm   Patient Position: Sitting   Cuff Size: Adult   Pulse: 63   SpO2: 98%   Weight: 94.8 kg (209 lb)   Height: 172.7 cm (68\")       Physical Exam  Vitals reviewed.   Constitutional:       General: She is not in acute distress.     Appearance: She is well-developed. She is obese. She is not diaphoretic.   HENT:      Head: Normocephalic and atraumatic. Hair is normal.      Right Ear: Hearing and external ear normal. No decreased hearing noted. No drainage.      Left Ear: Hearing and external ear normal. No decreased hearing noted.      Nose: Nose normal. No nasal deformity.      Mouth/Throat:      Mouth: Mucous membranes are moist.   Eyes:      General: Lids are normal.         Right eye: No discharge.         Left eye: No discharge.      Extraocular Movements: Extraocular movements intact.      Conjunctiva/sclera: Conjunctivae normal.      Pupils: Pupils are equal, round, and reactive to light.   Neck:      Thyroid: No thyromegaly.   Cardiovascular:      Rate and Rhythm: Normal rate and regular rhythm.      Pulses: Normal pulses.      Heart sounds: Normal heart sounds. No murmur heard.     No friction rub. No gallop.   Pulmonary:      Effort: Pulmonary effort is normal. No respiratory distress.      Breath sounds: Normal breath sounds. No wheezing or rales.   Chest:      Chest wall: No tenderness.   Abdominal:      General: Bowel sounds are normal. There is no distension.      Palpations: Abdomen is soft. There is no mass.      Tenderness: There is no abdominal tenderness. There is no guarding or rebound.      Hernia: No hernia is present.   Musculoskeletal:         General: No tenderness or deformity. Normal range of motion.      Cervical back: Normal range of motion and neck supple.   Lymphadenopathy:      Cervical: No cervical adenopathy.   Skin:     General: Skin is warm and dry.      Findings: No erythema or rash.   Neurological:      Mental Status: She is alert and oriented to person, place, and time.      " Motor: No abnormal muscle tone.      Coordination: Coordination normal.   Psychiatric:         Mood and Affect: Mood normal.         Behavior: Behavior normal.         Thought Content: Thought content normal.         Judgment: Judgment normal.          Result Review :              Assessment and Plan    Diagnoses and all orders for this visit:    1. Routine general medical examination at a health care facility (Primary)  Comments:  Counseled on routine dental and eye exams.  Counseled on tetanus and COVID vaccines.  Will notify patient of results and treat accordingly.  Orders:  -     Comprehensive Metabolic Panel; Future  -     Lipid Panel; Future  -     Hemoglobin A1c; Future  -     Cancel: Mammo Screening Digital Tomosynthesis Bilateral With CAD; Future    2. Polyuria  Comments:  Will notify patient of results and treat accordingly.    3. Class 1 obesity due to excess calories without serious comorbidity with body mass index (BMI) of 31.0 to 31.9 in adult  Assessment & Plan:  Patient's (Body mass index is 31.78 kg/m².) indicates that they are obese (BMI >30) with health conditions that include none . Weight is worsening. BMI  is above average; BMI management plan is completed. We discussed portion control and increasing exercise.  We will review labs and if patient would like to discuss any medically assisted weight loss, she can make a follow-up appointment to discuss options.    Orders:  -     Follicle stimulating hormone; Future  -     Luteinizing hormone; Future  -     Prolactin; Future  -     Testosterone Free Direct; Future  -     Estrogens, Total; Future    4. Palpitations  Assessment & Plan:  Follow-up with cardiology as needed.  Go to ED with any chest pains or shortness of air.      Other orders  -     Tdap Vaccine Greater Than or Equal To 8yo IM    Patient to notify office with any acute concerns or issues.  Patient verbalizes understanding, agrees with plan of care and has no further questions upon  discharge.    Please note that portions of this note were completed with a voice recognition program.    Follow Up    Return in about 1 year (around 10/16/2024) for Annual physical.  Patient was given instructions and counseling regarding her condition or for health maintenance advice. Please see specific information pulled into the AVS if appropriate.

## 2023-10-20 ENCOUNTER — LAB (OUTPATIENT)
Dept: LAB | Facility: HOSPITAL | Age: 48
End: 2023-10-20
Payer: COMMERCIAL

## 2023-10-20 DIAGNOSIS — E66.09 CLASS 1 OBESITY DUE TO EXCESS CALORIES WITHOUT SERIOUS COMORBIDITY WITH BODY MASS INDEX (BMI) OF 31.0 TO 31.9 IN ADULT: ICD-10-CM

## 2023-10-20 DIAGNOSIS — Z00.00 ROUTINE GENERAL MEDICAL EXAMINATION AT A HEALTH CARE FACILITY: ICD-10-CM

## 2023-10-20 LAB
ALBUMIN SERPL-MCNC: 4.1 G/DL (ref 3.5–5.2)
ALBUMIN/GLOB SERPL: 1.4 G/DL
ALP SERPL-CCNC: 83 U/L (ref 39–117)
ALT SERPL W P-5'-P-CCNC: 15 U/L (ref 1–33)
ANION GAP SERPL CALCULATED.3IONS-SCNC: 7.8 MMOL/L (ref 5–15)
AST SERPL-CCNC: 16 U/L (ref 1–32)
BILIRUB SERPL-MCNC: 0.4 MG/DL (ref 0–1.2)
BUN SERPL-MCNC: 8 MG/DL (ref 6–20)
BUN/CREAT SERPL: 11.6 (ref 7–25)
CALCIUM SPEC-SCNC: 9.1 MG/DL (ref 8.6–10.5)
CHLORIDE SERPL-SCNC: 103 MMOL/L (ref 98–107)
CHOLEST SERPL-MCNC: 152 MG/DL (ref 0–200)
CO2 SERPL-SCNC: 25.2 MMOL/L (ref 22–29)
CREAT SERPL-MCNC: 0.69 MG/DL (ref 0.57–1)
EGFRCR SERPLBLD CKD-EPI 2021: 107.2 ML/MIN/1.73
FSH SERPL-ACNC: 4.24 MIU/ML
GLOBULIN UR ELPH-MCNC: 2.9 GM/DL
GLUCOSE SERPL-MCNC: 94 MG/DL (ref 65–99)
HBA1C MFR BLD: 5.5 % (ref 4.8–5.6)
HDLC SERPL-MCNC: 38 MG/DL (ref 40–60)
LDLC SERPL CALC-MCNC: 98 MG/DL (ref 0–100)
LDLC/HDLC SERPL: 2.55 {RATIO}
LH SERPL-ACNC: 7.65 MIU/ML
POTASSIUM SERPL-SCNC: 4.4 MMOL/L (ref 3.5–5.2)
PROLACTIN SERPL-MCNC: 23 NG/ML (ref 4.79–23.3)
PROT SERPL-MCNC: 7 G/DL (ref 6–8.5)
SODIUM SERPL-SCNC: 136 MMOL/L (ref 136–145)
TRIGL SERPL-MCNC: 85 MG/DL (ref 0–150)
VLDLC SERPL-MCNC: 16 MG/DL (ref 5–40)

## 2023-10-20 PROCEDURE — 80053 COMPREHEN METABOLIC PANEL: CPT

## 2023-10-20 PROCEDURE — 84146 ASSAY OF PROLACTIN: CPT

## 2023-10-20 PROCEDURE — 83001 ASSAY OF GONADOTROPIN (FSH): CPT

## 2023-10-20 PROCEDURE — 82672 ASSAY OF ESTROGEN: CPT

## 2023-10-20 PROCEDURE — 84402 ASSAY OF FREE TESTOSTERONE: CPT

## 2023-10-20 PROCEDURE — 83036 HEMOGLOBIN GLYCOSYLATED A1C: CPT

## 2023-10-20 PROCEDURE — 36415 COLL VENOUS BLD VENIPUNCTURE: CPT

## 2023-10-20 PROCEDURE — 80061 LIPID PANEL: CPT

## 2023-10-20 PROCEDURE — 83002 ASSAY OF GONADOTROPIN (LH): CPT

## 2023-10-25 LAB — ESTROGEN SERPL-MCNC: 174 PG/ML

## 2023-10-27 LAB — TESTOST FREE SERPL-MCNC: 1.4 PG/ML (ref 0–4.2)

## 2023-12-04 ENCOUNTER — OFFICE VISIT (OUTPATIENT)
Dept: FAMILY MEDICINE CLINIC | Age: 48
End: 2023-12-04
Payer: COMMERCIAL

## 2023-12-04 VITALS
OXYGEN SATURATION: 97 % | HEART RATE: 68 BPM | BODY MASS INDEX: 33.3 KG/M2 | WEIGHT: 212.2 LBS | TEMPERATURE: 98.4 F | SYSTOLIC BLOOD PRESSURE: 120 MMHG | DIASTOLIC BLOOD PRESSURE: 77 MMHG | HEIGHT: 67 IN

## 2023-12-04 DIAGNOSIS — E66.09 CLASS 1 OBESITY DUE TO EXCESS CALORIES WITHOUT SERIOUS COMORBIDITY WITH BODY MASS INDEX (BMI) OF 33.0 TO 33.9 IN ADULT: Primary | ICD-10-CM

## 2023-12-04 PROBLEM — E66.811 CLASS 1 OBESITY DUE TO EXCESS CALORIES WITHOUT SERIOUS COMORBIDITY WITH BODY MASS INDEX (BMI) OF 31.0 TO 31.9 IN ADULT: Status: RESOLVED | Noted: 2023-10-16 | Resolved: 2023-12-04

## 2023-12-04 PROCEDURE — 99213 OFFICE O/P EST LOW 20 MIN: CPT | Performed by: NURSE PRACTITIONER

## 2023-12-04 NOTE — ASSESSMENT & PLAN NOTE
Patient's (Body mass index is 33.24 kg/m².) indicates that they are obese (BMI >30) with health conditions that include none . Weight is worsening. BMI  is above average; BMI management plan is completed. We discussed portion control, increasing exercise, pharmacologic options including GLP-1's, phentermine, Contrave and Wang, and calling insurance company to inquire about free obesity program .  Phentermine not an option due to palpitations.  Wang not an option due to fecal smearing.  Potential side effects of Contrave and GLP-1's discussed.  Patient is going to contact gastro to see if medication would be harmful, helpful or have no impact at all with current issues.  Continue routine exercise

## 2023-12-04 NOTE — PROGRESS NOTES
"Chief Complaint  Obesity (Interested in weight loss)    Subjective          Onelia Chauhan presents to [facility] to discuss weight loss options.  Patient states over the summer she was running routinely, watching diet with no effect on her weight.  She has not tried medications in the past.  She is open to nutritionist.  She has also started walking again with intermittent jogging.  Patient does have state insurance and is not sure if she has the free [facility] obesity program.  Patient also goes to gastroenterology for fecal smearing.  She states it is controlled with Colestid and fiber supplements.  Patient also has history of palpitations in the past.  Denies family history of thyroid cancer, personal history of pancreatitis and seizures.  Patient states she does not believe she eats a lot is in volume, but does crave sweet treat after eating.  This is usually a very small portion.  Does not eat/drink a lot of dairy.      Objective   Vital Signs:   Vitals:    12/04/23 1528   BP: 120/77   BP Location: Right arm   Patient Position: Sitting   Cuff Size: Large Adult   Pulse: 68   Temp: 98.4 °F (36.9 °C)   TempSrc: Oral   SpO2: 97%   Weight: 96.3 kg (212 lb 3.2 oz)   Height: 170.2 cm (67\")       Physical Exam  Vitals reviewed.   Constitutional:       General: She is not in acute distress.     Appearance: Normal appearance. She is well-developed.   HENT:      Head: Normocephalic and atraumatic.   Eyes:      Conjunctiva/sclera: Conjunctivae normal.      Pupils: Pupils are equal, round, and reactive to light.   Cardiovascular:      Rate and Rhythm: Normal rate and regular rhythm.      Heart sounds: Normal heart sounds. No murmur heard.  Pulmonary:      Effort: Pulmonary effort is normal. No respiratory distress.      Breath sounds: Normal breath sounds.   Skin:     General: Skin is warm and dry.   Neurological:      Mental Status: She is alert and oriented to person, place, and " time.   Psychiatric:         Mood and Affect: Mood and affect normal.         Behavior: Behavior normal.         Thought Content: Thought content normal.         Judgment: Judgment normal.          Result Review :              Assessment and Plan    Diagnoses and all orders for this visit:    1. Class 1 obesity due to excess calories without serious comorbidity with body mass index (BMI) of 33.0 to 33.9 in adult (Primary)  Assessment & Plan:  Patient's (Body mass index is 33.24 kg/m².) indicates that they are obese (BMI >30) with health conditions that include none . Weight is worsening. BMI  is above average; BMI management plan is completed. We discussed portion control, increasing exercise, pharmacologic options including GLP-1's, phentermine, Contrave and Wang, and calling insurance company to inquire about free obesity program .  Phentermine not an option due to palpitations.  Wang not an option due to fecal smearing.  Potential side effects of Contrave and GLP-1's discussed.  Patient is going to contact gastro to see if medication would be harmful, helpful or have no impact at all with current issues.  Continue routine exercise      Patient to notify office with any acute concerns or issues.  Patient verbalizes understanding, agrees with plan of care and has no further questions upon discharge.    Please note that portions of this note were completed with a voice recognition program.    Follow Up    Return for Will determine follow-up once medication is initiated.  Patient was given instructions and counseling regarding her condition or for health maintenance advice. Please see specific information pulled into the AVS if appropriate.

## 2023-12-07 ENCOUNTER — PATIENT MESSAGE (OUTPATIENT)
Dept: FAMILY MEDICINE CLINIC | Age: 48
End: 2023-12-07
Payer: COMMERCIAL

## 2023-12-07 NOTE — TELEPHONE ENCOUNTER
From: Onelia Chauhan  To: Harper Key  Sent: 12/7/2023 10:22 AM EST  Subject: Weight loss [facility] program    I’ve searched the [employer] page for the [facility] weight loss program and can’t find it. Is it through the [employer] wellness program the State sponsors rather than the insurance directly? There’s an option for “Health Coaching” on the [employer] wellness program. Is that what I need to enroll in?

## 2023-12-28 DIAGNOSIS — R19.5 LOOSE STOOLS: ICD-10-CM

## 2023-12-28 RX ORDER — MONTELUKAST SODIUM 4 MG/1
TABLET, CHEWABLE ORAL
Qty: 120 TABLET | Refills: 1 | Status: SHIPPED | OUTPATIENT
Start: 2023-12-28

## 2023-12-28 NOTE — TELEPHONE ENCOUNTER
Pt is requesting colestipol.  Order pended.   Last ov: 8/29/23  Next ov: 3/12/24  Last refill: 8/29/23

## 2024-01-10 ENCOUNTER — PRIOR AUTHORIZATION (OUTPATIENT)
Dept: FAMILY MEDICINE CLINIC | Age: 49
End: 2024-01-10
Payer: COMMERCIAL

## 2024-01-11 NOTE — TELEPHONE ENCOUNTER
Denied  Formulary alternatives: Qsymia, Saxenda, Wegovy, Orlistat. Requirement: 3 in a class with 3 or more alternatives, 2 in a class with 2 alternatives, or 1 in a class with on 1 alternative.

## 2024-01-11 NOTE — TELEPHONE ENCOUNTER
Please let pt know that zepbound is not covered, but saxenda is. This is the daily injection and is from the same class with the same possible adverse reactions.

## 2024-01-29 ENCOUNTER — OFFICE VISIT (OUTPATIENT)
Dept: FAMILY MEDICINE CLINIC | Age: 49
End: 2024-01-29
Payer: COMMERCIAL

## 2024-01-29 VITALS
WEIGHT: 215.2 LBS | TEMPERATURE: 98.6 F | OXYGEN SATURATION: 100 % | SYSTOLIC BLOOD PRESSURE: 128 MMHG | HEIGHT: 67 IN | BODY MASS INDEX: 33.78 KG/M2 | DIASTOLIC BLOOD PRESSURE: 75 MMHG | HEART RATE: 81 BPM

## 2024-01-29 DIAGNOSIS — H66.91 OTITIS OF RIGHT EAR: Primary | ICD-10-CM

## 2024-01-29 DIAGNOSIS — H92.01 EAR PAIN, RIGHT: ICD-10-CM

## 2024-01-29 PROCEDURE — 99213 OFFICE O/P EST LOW 20 MIN: CPT | Performed by: NURSE PRACTITIONER

## 2024-01-29 RX ORDER — CEFDINIR 300 MG/1
300 CAPSULE ORAL 2 TIMES DAILY
Qty: 14 CAPSULE | Refills: 0 | Status: SHIPPED | OUTPATIENT
Start: 2024-01-29 | End: 2024-01-29

## 2024-01-29 NOTE — PROGRESS NOTES
"Chief Complaint  Earache ((Right) Sx started 1-6-24/)    Subjective        Onelia Chauhan presents to [facility]  Earache   There is pain in the right ear. This is a recurrent problem. The current episode started yesterday. There has been no fever. Associated symptoms include neck pain. Pertinent negatives include no coughing, hearing loss or rhinorrhea. She has tried antibiotics for the symptoms. The treatment provided mild relief.   Using Flonase.    Objective   Vital Signs:  /75 (BP Location: Left arm, Patient Position: Sitting, Cuff Size: Adult)   Pulse 81   Temp 98.6 °F (37 °C) (Oral)   Ht 170.2 cm (67\")   Wt 97.6 kg (215 lb 3.2 oz)   SpO2 100% Comment: room air  BMI 33.71 kg/m²   Estimated body mass index is 33.71 kg/m² as calculated from the following:    Height as of this encounter: 170.2 cm (67\").    Weight as of this encounter: 97.6 kg (215 lb 3.2 oz).               Physical Exam  HENT:      Right Ear: Decreased hearing noted. Swelling and tenderness present. A middle ear effusion is present. Tympanic membrane is injected.      Left Ear: A middle ear effusion is present.   Cardiovascular:      Rate and Rhythm: Normal rate and regular rhythm.   Pulmonary:      Effort: Pulmonary effort is normal. No respiratory distress.      Breath sounds: Normal breath sounds. No stridor. No wheezing, rhonchi or rales.   Skin:     General: Skin is warm and dry.   Neurological:      Mental Status: She is alert and oriented to person, place, and time.   Psychiatric:         Mood and Affect: Mood normal.        Result Review :                     Assessment and Plan     Diagnoses and all orders for this visit:    1. Otitis of right ear (Primary)  -     Discontinue: cefdinir (OMNICEF) 300 MG capsule; Take 1 capsule by mouth 2 (Two) Times a Day for 7 days.  Dispense: 14 capsule; Refill: 0    2. Ear pain, right  -     Ambulatory Referral to ENT (Otolaryngology)    Will move forward " with ear nose and throat referral based upon no relief with typical treatment.  Recommend Benadryl at nighttime to aid in drying the inner ear.         Follow Up     Return if symptoms worsen or fail to improve.  Patient was given instructions and counseling regarding her condition or for health maintenance advice. Please see specific information pulled into the AVS if appropriate.

## 2024-03-12 ENCOUNTER — OFFICE VISIT (OUTPATIENT)
Dept: GASTROENTEROLOGY | Facility: CLINIC | Age: 49
End: 2024-03-12
Payer: COMMERCIAL

## 2024-03-12 VITALS
SYSTOLIC BLOOD PRESSURE: 142 MMHG | HEART RATE: 74 BPM | BODY MASS INDEX: 32.8 KG/M2 | HEIGHT: 67 IN | DIASTOLIC BLOOD PRESSURE: 69 MMHG | WEIGHT: 209 LBS

## 2024-03-12 DIAGNOSIS — R19.5 LOOSE STOOLS: ICD-10-CM

## 2024-03-12 PROCEDURE — 99213 OFFICE O/P EST LOW 20 MIN: CPT | Performed by: NURSE PRACTITIONER

## 2024-03-12 RX ORDER — MONTELUKAST SODIUM 4 MG/1
2 TABLET, CHEWABLE ORAL 2 TIMES DAILY
Qty: 360 TABLET | Refills: 2 | Status: SHIPPED | OUTPATIENT
Start: 2024-03-12

## 2024-03-12 NOTE — PROGRESS NOTES
"Chief Complaint     fecal smearing    History of Present Illness     Onelia Chauhan is a 49 y.o. female who presents to [facility] for follow-up of fecal smearing and loose stools.      She reports that symptoms are much improved.  She is now taking colestid 3 grams daily. Having 1-2 bowel movements per day.        History      Past Medical History:   Diagnosis Date    Asthma February 2022    Short of breath  after covid     Past Surgical History:   Procedure Laterality Date    COLONOSCOPY  June 2022    ENDOMETRIAL ABLATION  November 2022     Family History   Problem Relation Age of Onset    Diabetes Father     Stroke Maternal Grandmother     Heart attack Maternal Grandmother     Stroke Paternal Grandmother     Diabetes Paternal Grandmother     Diabetes Paternal Grandfather         Current Medications       Current Outpatient Medications:     colestipol (COLESTID) 1 g tablet, Take 2 tablets by mouth 2 (Two) Times a Day., Disp: 360 tablet, Rfl: 2    methylcellulose oral powder, Take  by mouth Daily., Disp: , Rfl:     multivitamin with minerals tablet tablet, Take 1 tablet by mouth Daily., Disp: , Rfl:      Allergies     No Known Allergies    Social History       Social History     Social History Narrative    /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         Objective       /69 (BP Location: Left arm, Patient Position: Sitting, Cuff Size: Adult)   Pulse 74   Ht 170.2 cm (67\")   Wt 94.8 kg (209 lb)   BMI 32.73 kg/m²       Physical Exam    Results       Result Review :    The following data was reviewed by: KYMBERLY Trejo on 03/12/2024:      CMP          10/20/2023    08:38   CMP   Glucose 94    BUN 8    Creatinine 0.69    EGFR 107.2    Sodium 136    Potassium 4.4    Chloride 103    Calcium 9.1    Total Protein 7.0    Albumin 4.1    Globulin 2.9    Total Bilirubin 0.4    Alkaline Phosphatase 83    AST (SGOT) 16    ALT (SGPT) 15    Albumin/Globulin Ratio 1.4  "   BUN/Creatinine Ratio 11.6    Anion Gap 7.8                 Assessment and Plan              Diagnoses and all orders for this visit:    1. Loose stools  -     colestipol (COLESTID) 1 g tablet; Take 2 tablets by mouth 2 (Two) Times a Day.  Dispense: 360 tablet; Refill: 2            Follow Up     Follow Up   Return in about 1 year (around 3/12/2025) for fecal smearing.  Patient was given instructions and counseling regarding her condition or for health maintenance advice. Please see specific information pulled into the AVS if appropriate.      Oxytocin administered for at least 12-18 hours after membrane rupture, without achieving cervical change and regular contractions

## 2024-05-02 ENCOUNTER — PATIENT MESSAGE (OUTPATIENT)
Dept: FAMILY MEDICINE CLINIC | Age: 49
End: 2024-05-02
Payer: COMMERCIAL

## 2024-05-02 NOTE — TELEPHONE ENCOUNTER
From: Onelia Chauhan  To: Harper Key  Sent: 5/2/2024 12:15 PM EDT  Subject: Saxenda vs Weygovy    I’m in the [facility] weight loss program and am now eligible the Saxenda that you prescribed. However it is going to be $1094 for the prescription. I looked for a prescription saving card online and cannot find one but the same drug company has a coupon for Weygovy. Can I be prescribed that instead?    If not, I can pay for the saxenda and meet my deductible more quickly but just thought I’d explore all the options.     Thanks

## 2024-05-06 RX ORDER — SEMAGLUTIDE 0.25 MG/.5ML
0.25 INJECTION, SOLUTION SUBCUTANEOUS WEEKLY
Qty: 2 ML | Refills: 1 | Status: SHIPPED | OUTPATIENT
Start: 2024-05-06

## 2024-05-28 RX ORDER — SEMAGLUTIDE 0.5 MG/.5ML
0.5 INJECTION, SOLUTION SUBCUTANEOUS WEEKLY
Qty: 2 ML | Refills: 0 | Status: SHIPPED | OUTPATIENT
Start: 2024-05-28

## 2024-05-28 RX ORDER — SEMAGLUTIDE 0.25 MG/.5ML
0.25 INJECTION, SOLUTION SUBCUTANEOUS WEEKLY
Qty: 2 ML | Refills: 1 | Status: CANCELLED | OUTPATIENT
Start: 2024-05-28

## 2024-06-24 ENCOUNTER — OFFICE VISIT (OUTPATIENT)
Dept: FAMILY MEDICINE CLINIC | Age: 49
End: 2024-06-24
Payer: COMMERCIAL

## 2024-06-24 VITALS
OXYGEN SATURATION: 100 % | BODY MASS INDEX: 31.96 KG/M2 | SYSTOLIC BLOOD PRESSURE: 119 MMHG | HEART RATE: 79 BPM | TEMPERATURE: 98 F | DIASTOLIC BLOOD PRESSURE: 71 MMHG | HEIGHT: 67 IN | WEIGHT: 203.6 LBS

## 2024-06-24 DIAGNOSIS — B07.9 VIRAL WARTS, UNSPECIFIED TYPE: ICD-10-CM

## 2024-06-24 DIAGNOSIS — R15.1 FECAL SMEARING: ICD-10-CM

## 2024-06-24 DIAGNOSIS — E66.09 CLASS 1 OBESITY DUE TO EXCESS CALORIES WITH SERIOUS COMORBIDITY AND BODY MASS INDEX (BMI) OF 31.0 TO 31.9 IN ADULT: Primary | ICD-10-CM

## 2024-06-24 PROBLEM — E66.811 CLASS 1 OBESITY DUE TO EXCESS CALORIES WITHOUT SERIOUS COMORBIDITY WITH BODY MASS INDEX (BMI) OF 33.0 TO 33.9 IN ADULT: Status: RESOLVED | Noted: 2023-10-16 | Resolved: 2024-06-24

## 2024-06-24 PROCEDURE — 99213 OFFICE O/P EST LOW 20 MIN: CPT | Performed by: NURSE PRACTITIONER

## 2024-06-24 RX ORDER — SEMAGLUTIDE 0.5 MG/.5ML
0.5 INJECTION, SOLUTION SUBCUTANEOUS WEEKLY
Qty: 2 ML | Refills: 0 | Status: SHIPPED | OUTPATIENT
Start: 2024-06-24

## 2024-06-24 NOTE — ASSESSMENT & PLAN NOTE
Patient's (Body mass index is 31.88 kg/m².) indicates that they are  with health conditions that include  . Weight is . BMI  . We discussed .

## 2024-06-24 NOTE — PROGRESS NOTES
"Chief Complaint  Wart (Would like wart on finger removed) and Weight Loss (Wants to discuss next dose)    Subjective          Onelia Chauhan presents to [facility] to discuss Wegovy dose.  She is on 0.5 mg weekly and is doing well.  She has lost 10 pounds.  She is ready to increase dose, however she is going on a trip to [place].  She is concerned that she is going to have some nausea with dose increase.    She states that her stool incontinence has improved while on medication as well.  She has had her follow-up with gastro.  She is only taking the colestipol 1 g daily instead of 2 g twice daily.    She has 2 viral warts on her fingers that she would like frozen off.      Objective   Vital Signs:   Vitals:    06/24/24 1438   BP: 119/71   BP Location: Right arm   Patient Position: Sitting   Cuff Size: Adult   Pulse: 79   Temp: 98 °F (36.7 °C)   TempSrc: Oral   SpO2: 100%   Weight: 92.4 kg (203 lb 9.6 oz)   Height: 170.2 cm (67.01\")       Body mass index is 31.88 kg/m².         Physical Exam  Constitutional:       General: She is not in acute distress.     Appearance: Normal appearance. She is not ill-appearing.   Pulmonary:      Effort: Pulmonary effort is normal. No respiratory distress.   Skin:     Comments: Very small, flesh-colored wart to fourth digit of left hand and fifth digit of right.   Neurological:      Mental Status: She is alert and oriented to person, place, and time.   Psychiatric:         Mood and Affect: Mood normal.         Behavior: Behavior normal.         Thought Content: Thought content normal.         Judgment: Judgment normal.            Lab Results   Component Value Date    GLUCOSE 94 10/20/2023    BUN 8 10/20/2023    CREATININE 0.69 10/20/2023    EGFR 107.2 10/20/2023    BCR 11.6 10/20/2023    K 4.4 10/20/2023    CO2 25.2 10/20/2023    CALCIUM 9.1 10/20/2023    ALBUMIN 4.1 10/20/2023    BILITOT 0.4 10/20/2023    AST 16 10/20/2023    ALT 15 10/20/2023 "     Lab Results   Component Value Date    HGBA1C 5.50 10/20/2023     Lab Results   Component Value Date    WBC 7.69 10/20/2022    HGB 13.2 10/20/2022    HCT 41.0 10/20/2022    MCV 95.6 10/20/2022     10/20/2022     Lab Results   Component Value Date    CHOL 152 10/20/2023    CHOL 158 03/18/2022     Lab Results   Component Value Date    TRIG 85 10/20/2023    TRIG 100 03/18/2022     Lab Results   Component Value Date    HDL 38 (L) 10/20/2023    HDL 40 03/18/2022     Lab Results   Component Value Date    LDL 98 10/20/2023    LDL 99 03/18/2022     Lab Results   Component Value Date    TSH 1.680 03/18/2022       Cryotherapy, Skin Lesion    Date/Time: 6/24/2024 2:58 PM    Performed by: Harper Key APRN  Authorized by: Harper Key APRN  Local anesthesia used: no    Anesthesia:  Local anesthesia used: no    Sedation:  Patient sedated: no    Patient tolerance: patient tolerated the procedure well with no immediate complications            Assessment and Plan    Assessment & Plan  Class 1 obesity due to excess calories with serious comorbidity and body mass index (BMI) of 31.0 to 31.9 in adult  Patient's (Body mass index is 31.88 kg/m².) indicates that they are obese (BMI >30) with health conditions that include  . Weight is . BMI  . We discussed .   Fecal smearing  Continue colestipol and follow-up with gastro.  Improving with GLP-1.  Viral warts, unspecified type  Warts are frozen.  Consent obtained.  Areas with soap and water.  May have to return for repeat treatment possibly several times.     Orders Placed This Encounter   Procedures    Cryotherapy, Skin Lesion     New Medications Ordered This Visit   Medications    Semaglutide-Weight Management (Wegovy) 0.5 MG/0.5ML solution auto-injector     Sig: Inject 0.5 mL under the skin into the appropriate area as directed 1 (One) Time Per Week.     Dispense:  2 mL     Refill:  0         Patient to notify office with any acute concerns or issues.  Patient  verbalizes understanding, agrees with plan of care and has no further questions upon discharge.    Please note that portions of this note were completed with a voice recognition program.      Follow Up    Return if symptoms worsen or fail to improve.  Patient was given instructions and counseling regarding her condition or for health maintenance advice. Please see specific information pulled into the AVS if appropriate.     Medications Discontinued During This Encounter   Medication Reason    methylcellulose oral powder Historical Med - Therapy completed    Semaglutide-Weight Management (Wegovy) 0.5 MG/0.5ML solution auto-injector Reorder

## 2024-06-24 NOTE — ASSESSMENT & PLAN NOTE
Patient's (Body mass index is 31.88 kg/m².) indicates that they are obese (BMI >30) with health conditions that include none . Weight is improving with treatment. BMI  is above average; BMI management plan is completed. We discussed portion control, increasing exercise, and pharmacologic options including keeping Wegovy at 0.5 mg weekly until she returns from her trip to [place].  Continue routine exercise.  When she is ready to increase to 1 mg, she is to notify office..

## 2024-07-23 RX ORDER — SEMAGLUTIDE 0.5 MG/.5ML
0.5 INJECTION, SOLUTION SUBCUTANEOUS WEEKLY
Qty: 2 ML | Refills: 0 | Status: CANCELLED | OUTPATIENT
Start: 2024-07-23

## 2024-07-25 RX ORDER — SEMAGLUTIDE 1 MG/.5ML
1 INJECTION, SOLUTION SUBCUTANEOUS WEEKLY
Qty: 2 ML | Refills: 1 | Status: SHIPPED | OUTPATIENT
Start: 2024-07-25

## 2024-08-16 RX ORDER — SEMAGLUTIDE 1 MG/.5ML
1 INJECTION, SOLUTION SUBCUTANEOUS WEEKLY
Qty: 6 ML | Refills: 1 | Status: SHIPPED | OUTPATIENT
Start: 2024-08-16

## 2024-10-18 ENCOUNTER — OFFICE VISIT (OUTPATIENT)
Dept: FAMILY MEDICINE CLINIC | Age: 49
End: 2024-10-18
Payer: COMMERCIAL

## 2024-10-18 VITALS
DIASTOLIC BLOOD PRESSURE: 76 MMHG | WEIGHT: 189 LBS | SYSTOLIC BLOOD PRESSURE: 116 MMHG | OXYGEN SATURATION: 97 % | HEIGHT: 67 IN | HEART RATE: 82 BPM | TEMPERATURE: 98.7 F | BODY MASS INDEX: 29.66 KG/M2

## 2024-10-18 DIAGNOSIS — Z12.83 SKIN CANCER SCREENING: ICD-10-CM

## 2024-10-18 DIAGNOSIS — E66.3 OVERWEIGHT (BMI 25.0-29.9): ICD-10-CM

## 2024-10-18 DIAGNOSIS — Z00.00 ROUTINE GENERAL MEDICAL EXAMINATION AT A HEALTH CARE FACILITY: Primary | ICD-10-CM

## 2024-10-18 DIAGNOSIS — Z86.39 HISTORY OF OBESITY: ICD-10-CM

## 2024-10-18 PROBLEM — E66.09 CLASS 1 OBESITY DUE TO EXCESS CALORIES WITH SERIOUS COMORBIDITY AND BODY MASS INDEX (BMI) OF 31.0 TO 31.9 IN ADULT: Status: RESOLVED | Noted: 2023-10-16 | Resolved: 2024-10-18

## 2024-10-18 PROBLEM — E66.811 CLASS 1 OBESITY DUE TO EXCESS CALORIES WITH SERIOUS COMORBIDITY AND BODY MASS INDEX (BMI) OF 31.0 TO 31.9 IN ADULT: Status: RESOLVED | Noted: 2023-10-16 | Resolved: 2024-10-18

## 2024-10-18 PROCEDURE — 99396 PREV VISIT EST AGE 40-64: CPT | Performed by: NURSE PRACTITIONER

## 2024-10-18 NOTE — PROGRESS NOTES
"Chief Complaint  Annual Exam    Subjective          Onelia Chauhan presents to [facility] for annual exam. She states she is doing well. She was 215 when starting Wegovy and is 189 today. Her goal weight is in the 150s. She is walking 3-5 days a week. 4-5 miles each day.     Sees Dr. Zamorano for pap smear and mammogram. Declines flu and COVID vaccines.   Review of Systems   Constitutional:  Negative for fatigue.   HENT:  Negative for hearing loss and trouble swallowing.    Respiratory:  Negative for cough and shortness of breath.    Cardiovascular:  Negative for chest pain, palpitations and leg swelling.   Gastrointestinal:  Negative for abdominal pain, constipation, diarrhea, nausea and vomiting.   Genitourinary:  Negative for difficulty urinating.   Musculoskeletal:  Negative for arthralgias and myalgias.   Skin:  Negative for rash.   Neurological:  Negative for headaches.   Psychiatric/Behavioral:  Negative for dysphoric mood, sleep disturbance and suicidal ideas. The patient is not nervous/anxious.         Objective   Vital Signs:   Vitals:    10/18/24 1613   BP: 116/76   BP Location: Right arm   Patient Position: Sitting   Cuff Size: Adult   Pulse: 82   Temp: 98.7 °F (37.1 °C)   TempSrc: Oral   SpO2: 97%   Weight: 85.7 kg (189 lb)   Height: 170.2 cm (67.01\")       Body mass index is 29.59 kg/m².        Physical Exam  Vitals reviewed.   Constitutional:       General: She is not in acute distress.     Appearance: Normal appearance. She is well-developed. She is not diaphoretic.   HENT:      Head: Normocephalic and atraumatic. Hair is normal.      Right Ear: Hearing and external ear normal. No decreased hearing noted. No drainage.      Left Ear: Hearing and external ear normal. No decreased hearing noted.      Nose: Nose normal. No nasal deformity.      Mouth/Throat:      Mouth: Mucous membranes are moist.   Eyes:      General: Lids are normal.         Right eye: No discharge.    "      Left eye: No discharge.      Extraocular Movements: Extraocular movements intact.      Conjunctiva/sclera: Conjunctivae normal.      Pupils: Pupils are equal, round, and reactive to light.   Neck:      Thyroid: No thyromegaly.   Cardiovascular:      Rate and Rhythm: Normal rate and regular rhythm.      Pulses: Normal pulses.      Heart sounds: Normal heart sounds. No murmur heard.     No friction rub. No gallop.   Pulmonary:      Effort: Pulmonary effort is normal. No respiratory distress.      Breath sounds: Normal breath sounds. No wheezing or rales.   Chest:      Chest wall: No tenderness.   Abdominal:      General: Bowel sounds are normal. There is no distension.      Palpations: Abdomen is soft. There is no mass.      Tenderness: There is no abdominal tenderness. There is no guarding or rebound.      Hernia: No hernia is present.   Musculoskeletal:         General: No tenderness or deformity. Normal range of motion.      Cervical back: Normal range of motion and neck supple.   Lymphadenopathy:      Cervical: No cervical adenopathy.   Skin:     General: Skin is warm and dry.      Findings: No erythema or rash.   Neurological:      Mental Status: She is alert and oriented to person, place, and time.      Motor: No abnormal muscle tone.      Coordination: Coordination normal.   Psychiatric:         Mood and Affect: Mood normal.         Behavior: Behavior normal.         Thought Content: Thought content normal.         Judgment: Judgment normal.            Lab Results   Component Value Date    GLUCOSE 94 10/20/2023    BUN 8 10/20/2023    CREATININE 0.69 10/20/2023    EGFR 107.2 10/20/2023    BCR 11.6 10/20/2023    K 4.4 10/20/2023    CO2 25.2 10/20/2023    CALCIUM 9.1 10/20/2023    ALBUMIN 4.1 10/20/2023    BILITOT 0.4 10/20/2023    AST 16 10/20/2023    ALT 15 10/20/2023     Lab Results   Component Value Date    HGBA1C 5.50 10/20/2023     Lab Results   Component Value Date    WBC 7.69 10/20/2022    HGB 13.2  10/20/2022    HCT 41.0 10/20/2022    MCV 95.6 10/20/2022     10/20/2022     Lab Results   Component Value Date    CHOL 152 10/20/2023    CHOL 158 03/18/2022     Lab Results   Component Value Date    TRIG 85 10/20/2023    TRIG 100 03/18/2022     Lab Results   Component Value Date    HDL 38 (L) 10/20/2023    HDL 40 03/18/2022     Lab Results   Component Value Date    LDL 98 10/20/2023    LDL 99 03/18/2022     Lab Results   Component Value Date    TSH 1.680 03/18/2022                  Assessment and Plan    Assessment & Plan  Routine general medical examination at a health care facility  Counseled on routine dental and eye exams.  Declines covid and flu vaccines.  Due for mammogram with gynecology.  Up-to-date on routine blood work. Reviewed today with pt.     Overweight (BMI 25.0-29.9)  Patient's (Body mass index is 29.59 kg/m².) indicates that they are overweight with health conditions that include none . Weight is improving with treatment. BMI is above average; BMI management plan is completed. We discussed portion control, increasing exercise, and pharmacologic options including continuing on wegovy 1mg weekly. Once BMI is to 27, will start to wean down to get to maintenance rx.      History of obesity  Continue Wevogy. See above  Skin cancer screening  Referral initiated.     Orders Placed This Encounter   Procedures    Ambulatory Referral to Dermatology            Patient to notify office with any acute concerns or issues.  Patient verbalizes understanding, agrees with plan of care and has no further questions upon discharge.    Please note that portions of this note were completed with a voice recognition program.      Follow Up    Return in about 3 months (around 1/18/2025) for Recheck.  Patient was given instructions and counseling regarding her condition or for health maintenance advice. Please see specific information pulled into the AVS if appropriate.   There are no discontinued medications.

## 2025-01-09 ENCOUNTER — TELEPHONE (OUTPATIENT)
Dept: FAMILY MEDICINE CLINIC | Age: 50
End: 2025-01-09
Payer: COMMERCIAL

## 2025-01-09 DIAGNOSIS — I07.1 TRICUSPID VALVE INSUFFICIENCY, UNSPECIFIED ETIOLOGY: Primary | ICD-10-CM

## 2025-01-21 ENCOUNTER — HOSPITAL ENCOUNTER (OUTPATIENT)
Facility: HOSPITAL | Age: 50
Discharge: HOME | End: 2025-01-21
Admitting: NURSE PRACTITIONER
Payer: COMMERCIAL

## 2025-01-21 DIAGNOSIS — I07.1 TRICUSPID VALVE INSUFFICIENCY, UNSPECIFIED ETIOLOGY: ICD-10-CM

## 2025-01-21 LAB
AORTIC DIMENSIONLESS INDEX: 0.84 (DI)
ASCENDING AORTA: 2.6 CM
AV MEAN PRESS GRAD SYS DOP V1V2: 4 MMHG
AV VMAX SYS DOP: 138 CM/SEC
BH CV ECHO MEAS - AO MAX PG: 7.6 MMHG
BH CV ECHO MEAS - AO ROOT DIAM: 2.5 CM
BH CV ECHO MEAS - AO V2 VTI: 29.3 CM
BH CV ECHO MEAS - AVA(I,D): 2.9 CM2
BH CV ECHO MEAS - EDV(CUBED): 97.3 ML
BH CV ECHO MEAS - EDV(MOD-SP2): 102 ML
BH CV ECHO MEAS - EDV(MOD-SP4): 74.1 ML
BH CV ECHO MEAS - EF(MOD-SP2): 66.3 %
BH CV ECHO MEAS - EF(MOD-SP4): 56.4 %
BH CV ECHO MEAS - ESV(CUBED): 32.8 ML
BH CV ECHO MEAS - ESV(MOD-SP2): 34.4 ML
BH CV ECHO MEAS - ESV(MOD-SP4): 32.3 ML
BH CV ECHO MEAS - FS: 30.4 %
BH CV ECHO MEAS - IVS/LVPW: 0.7 CM
BH CV ECHO MEAS - IVSD: 0.7 CM
BH CV ECHO MEAS - LA DIMENSION: 2.7 CM
BH CV ECHO MEAS - LAT PEAK E' VEL: 14.8 CM/SEC
BH CV ECHO MEAS - LV DIASTOLIC VOL/BSA (35-75): 37.5 CM2
BH CV ECHO MEAS - LV MASS(C)D: 127.7 GRAMS
BH CV ECHO MEAS - LV MAX PG: 6 MMHG
BH CV ECHO MEAS - LV MEAN PG: 3 MMHG
BH CV ECHO MEAS - LV SYSTOLIC VOL/BSA (12-30): 16.4 CM2
BH CV ECHO MEAS - LV V1 MAX: 122 CM/SEC
BH CV ECHO MEAS - LV V1 VTI: 24.5 CM
BH CV ECHO MEAS - LVIDD: 4.6 CM
BH CV ECHO MEAS - LVIDS: 3.2 CM
BH CV ECHO MEAS - LVOT AREA: 3.5 CM2
BH CV ECHO MEAS - LVOT DIAM: 2.1 CM
BH CV ECHO MEAS - LVPWD: 1 CM
BH CV ECHO MEAS - MED PEAK E' VEL: 10.2 CM/SEC
BH CV ECHO MEAS - MV A MAX VEL: 60.9 CM/SEC
BH CV ECHO MEAS - MV DEC TIME: 0.24 SEC
BH CV ECHO MEAS - MV E MAX VEL: 70.3 CM/SEC
BH CV ECHO MEAS - MV E/A: 1.15
BH CV ECHO MEAS - PA V2 MAX: 95 CM/SEC
BH CV ECHO MEAS - RVDD: 2.8 CM
BH CV ECHO MEAS - SV(LVOT): 84.9 ML
BH CV ECHO MEAS - SV(MOD-SP2): 67.6 ML
BH CV ECHO MEAS - SV(MOD-SP4): 41.8 ML
BH CV ECHO MEAS - SVI(LVOT): 43 ML/M2
BH CV ECHO MEAS - SVI(MOD-SP2): 34.2 ML/M2
BH CV ECHO MEAS - SVI(MOD-SP4): 21.2 ML/M2
BH CV ECHO MEAS - TAPSE (>1.6): 3.2 CM
BH CV ECHO MEASUREMENTS AVERAGE E/E' RATIO: 5.62
BH CV XLRA - TDI S': 13.8 CM/SEC
IVRT: 87 MS
LEFT ATRIUM VOLUME INDEX: 16.8 ML/M2
LV EF BIPLANE MOD: 62.8 %

## 2025-01-21 PROCEDURE — 93306 TTE W/DOPPLER COMPLETE: CPT

## 2025-01-23 ENCOUNTER — PATIENT MESSAGE (OUTPATIENT)
Dept: FAMILY MEDICINE CLINIC | Age: 50
End: 2025-01-23

## 2025-01-23 ENCOUNTER — OFFICE VISIT (OUTPATIENT)
Dept: FAMILY MEDICINE CLINIC | Age: 50
End: 2025-01-23
Payer: COMMERCIAL

## 2025-01-23 VITALS
TEMPERATURE: 98 F | HEIGHT: 67 IN | OXYGEN SATURATION: 98 % | HEART RATE: 78 BPM | BODY MASS INDEX: 28.91 KG/M2 | SYSTOLIC BLOOD PRESSURE: 122 MMHG | DIASTOLIC BLOOD PRESSURE: 79 MMHG | WEIGHT: 184.2 LBS

## 2025-01-23 DIAGNOSIS — K59.00 CONSTIPATION, UNSPECIFIED CONSTIPATION TYPE: ICD-10-CM

## 2025-01-23 DIAGNOSIS — R15.1 FECAL SMEARING: ICD-10-CM

## 2025-01-23 DIAGNOSIS — E66.3 OVERWEIGHT (BMI 25.0-29.9): Primary | ICD-10-CM

## 2025-01-23 PROCEDURE — 99214 OFFICE O/P EST MOD 30 MIN: CPT | Performed by: NURSE PRACTITIONER

## 2025-01-23 RX ORDER — MUPIROCIN 20 MG/G
OINTMENT TOPICAL
COMMUNITY
Start: 2024-11-22 | End: 2025-01-23

## 2025-01-23 RX ORDER — SEMAGLUTIDE 1 MG/.5ML
1 INJECTION, SOLUTION SUBCUTANEOUS WEEKLY
Qty: 6 ML | Refills: 1 | Status: SHIPPED | OUTPATIENT
Start: 2025-01-23

## 2025-01-23 RX ORDER — TACROLIMUS 1 MG/G
OINTMENT TOPICAL
COMMUNITY
Start: 2024-12-30

## 2025-01-23 NOTE — PROGRESS NOTES
"Chief Complaint  Weight Check (3 month )    Subjective          Onelia Chauhan presents to [facility] for weight loss follow-up.  Patient is doing well on the Wegovy 1 mg weekly.  She is losing approximately half a pound per week.  She is still working out routinely and has even started to run as she has lost more weight.  Again goal weight is in the 150s.  Patient did say she had 1 episode of constipation.  She had stopped her Colestid for approximately 1-1/2 to 2 weeks and is now only taking 1 g daily.      Objective   Vital Signs:   Vitals:    01/23/25 1553   BP: 122/79   Pulse: 78   Temp: 98 °F (36.7 °C)   TempSrc: Oral   SpO2: 98%  Comment: room air   Weight: 83.6 kg (184 lb 3.2 oz)   Height: 170.2 cm (67.01\")       Body mass index is 28.84 kg/m².          Physical Exam  Vitals reviewed.   Constitutional:       General: She is not in acute distress.     Appearance: She is well-developed.   HENT:      Head: Normocephalic and atraumatic.   Eyes:      Conjunctiva/sclera: Conjunctivae normal.      Pupils: Pupils are equal, round, and reactive to light.   Cardiovascular:      Rate and Rhythm: Normal rate and regular rhythm.      Heart sounds: Normal heart sounds. No murmur heard.  Pulmonary:      Effort: Pulmonary effort is normal. No respiratory distress.      Breath sounds: Normal breath sounds.   Skin:     General: Skin is warm and dry.   Neurological:      Mental Status: She is alert and oriented to person, place, and time.   Psychiatric:         Mood and Affect: Mood and affect normal.         Behavior: Behavior normal.         Thought Content: Thought content normal.         Judgment: Judgment normal.                     Assessment and Plan    Assessment & Plan  Overweight (BMI 25.0-29.9)  Patient's (Body mass index is 28.84 kg/m².) indicates that they are overweight with health conditions that include none . Weight is improving with treatment. BMI is above average; BMI " management plan is completed. We discussed portion control, increasing exercise, and pharmacologic options including continuing semaglutide 1 mg weekly until goal of 150s has been met.  Patient agrees to cut back to maintenance dose at that time.  Continue routine exercises.  Ensure there is enough protein intake and focus on weight lifting as well. .  Patient is going through the [facility] weight loss program.  They get labs every 6 months.         Fecal smearing  Improved with Colestid 1 g daily.  Continue follow-up with gastro.       Constipation, unspecified constipation type  Continue Colestid 1 g daily instead of twice daily.  If it returns she could even try every other day or every third day even.  Increase water and fiber intake.         Patient to notify office with any acute concerns or issues.  Patient verbalizes understanding, agrees with plan of care and has no further questions upon discharge.    Please note that portions of this note were completed with a voice recognition program.      Follow Up    Return in about 3 months (around 4/23/2025) for Recheck.  Patient was given instructions and counseling regarding her condition or for health maintenance advice. Please see specific information pulled into the AVS if appropriate.     Medications Discontinued During This Encounter   Medication Reason    Semaglutide-Weight Management (Wegovy) 1 MG/0.5ML solution auto-injector Reorder    mupirocin (BACTROBAN) 2 % ointment Historical Med - Therapy completed

## 2025-01-24 ENCOUNTER — PRIOR AUTHORIZATION (OUTPATIENT)
Dept: FAMILY MEDICINE CLINIC | Age: 50
End: 2025-01-24
Payer: COMMERCIAL

## 2025-03-12 ENCOUNTER — OFFICE VISIT (OUTPATIENT)
Dept: GASTROENTEROLOGY | Facility: CLINIC | Age: 50
End: 2025-03-12
Payer: COMMERCIAL

## 2025-03-12 VITALS
SYSTOLIC BLOOD PRESSURE: 122 MMHG | WEIGHT: 181.2 LBS | BODY MASS INDEX: 28.44 KG/M2 | HEIGHT: 67 IN | HEART RATE: 75 BPM | DIASTOLIC BLOOD PRESSURE: 70 MMHG

## 2025-03-12 DIAGNOSIS — R19.5 LOOSE STOOLS: Primary | ICD-10-CM

## 2025-03-12 RX ORDER — COLESTIPOL HYDROCHLORIDE 1 G/1
2 TABLET ORAL 2 TIMES DAILY
Qty: 360 TABLET | Refills: 2 | Status: SHIPPED | OUTPATIENT
Start: 2025-03-12

## 2025-03-12 NOTE — PROGRESS NOTES
"Chief Complaint     fecal smearing    History of Present Illness     Onelia Chauhan is a 50 y.o. female who presents to [facility] for follow-up of loose stools.      She reports that she's doing well.  She went on a cruise last month and experienced some loose stools while traveling.  Taking 2 colestid per day now and feels that symptoms are controlled.         History      Past Medical History:   Diagnosis Date    Asthma February 2022    Short of breath  after covid     Past Surgical History:   Procedure Laterality Date    COLONOSCOPY  June 2022    ENDOMETRIAL ABLATION  November 2022     Family History   Problem Relation Age of Onset    Diabetes Father     Stroke Maternal Grandmother     Heart attack Maternal Grandmother     Stroke Paternal Grandmother     Diabetes Paternal Grandmother     Diabetes Paternal Grandfather     Hyperlipidemia Mother         Current Medications       Current Outpatient Medications:     colestipol (COLESTID) 1 g tablet, Take 2 tablets by mouth 2 (Two) Times a Day., Disp: 360 tablet, Rfl: 2    multivitamin with minerals tablet tablet, Take 1 tablet by mouth Daily., Disp: , Rfl:     Semaglutide-Weight Management (Wegovy) 1 MG/0.5ML solution auto-injector, Inject 0.5 mL under the skin into the appropriate area as directed 1 (One) Time Per Week., Disp: 6 mL, Rfl: 1    tacrolimus (PROTOPIC) 0.1 % ointment, APPLY TO THE AFFECTED AREA(S) ONCE DAILY FOR 3 MONTHS, Disp: , Rfl:      Allergies     No Known Allergies    Social History       Social History     Social History Narrative    /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         Objective       /70 (BP Location: Left arm, Patient Position: Sitting, Cuff Size: Adult)   Pulse 75   Ht 170.2 cm (67.01\")   Wt 82.2 kg (181 lb 3.2 oz)   BMI 28.37 kg/m²       Physical Exam    Results       Result Review :    The following data was reviewed by: KYMBERLY Trejo on 03/12/2025:      2/11/2024 CMP: " Creatinine 0.64, alk phos 92, AST 13, ALT 8, total bilirubin 0.2.                 Assessment and Plan              Diagnoses and all orders for this visit:    1. Loose stools (Primary)  -     colestipol (COLESTID) 1 g tablet; Take 2 tablets by mouth 2 (Two) Times a Day.  Dispense: 360 tablet; Refill: 2          Follow Up     Follow Up   Return in about 1 year (around 3/12/2026) for loose stools.  Patient was given instructions and counseling regarding her condition or for health maintenance advice. Please see specific information pulled into the AVS if appropriate.

## 2025-04-28 ENCOUNTER — OFFICE VISIT (OUTPATIENT)
Dept: FAMILY MEDICINE CLINIC | Age: 50
End: 2025-04-28
Payer: COMMERCIAL

## 2025-04-28 VITALS
TEMPERATURE: 95.9 F | SYSTOLIC BLOOD PRESSURE: 109 MMHG | OXYGEN SATURATION: 100 % | HEIGHT: 67 IN | BODY MASS INDEX: 28.41 KG/M2 | HEART RATE: 73 BPM | DIASTOLIC BLOOD PRESSURE: 71 MMHG | WEIGHT: 181 LBS

## 2025-04-28 DIAGNOSIS — Z86.39 HISTORY OF OBESITY: ICD-10-CM

## 2025-04-28 DIAGNOSIS — E66.3 OVERWEIGHT (BMI 25.0-29.9): Primary | ICD-10-CM

## 2025-04-28 NOTE — PROGRESS NOTES
"Chief Complaint  Weight Loss    Subjective          Onelia Chauhan presents to [facility] for weight loss f/u. She is currently on wegovy 1mg weekly. Has lost only 3 pounds since last visit. Has slacked off of routine workouts, but is picking them back up with the warmer weather. Goal weight is in the 150s. Tolerating well with no adverse reactions.         Objective   Vital Signs:   Vitals:    04/28/25 1440   BP: 109/71   Pulse: 73   Temp: 95.9 °F (35.5 °C)   TempSrc: Temporal   SpO2: 100%   Weight: 82.1 kg (181 lb)   Height: 170.2 cm (67.01\")       Body mass index is 28.34 kg/m².          Physical Exam  Vitals reviewed.   Constitutional:       General: She is not in acute distress.     Appearance: She is well-developed.   HENT:      Head: Normocephalic and atraumatic.   Eyes:      Conjunctiva/sclera: Conjunctivae normal.      Pupils: Pupils are equal, round, and reactive to light.   Cardiovascular:      Rate and Rhythm: Normal rate and regular rhythm.      Heart sounds: Normal heart sounds. No murmur heard.  Pulmonary:      Effort: Pulmonary effort is normal. No respiratory distress.      Breath sounds: Normal breath sounds.   Skin:     General: Skin is warm and dry.   Neurological:      Mental Status: She is alert and oriented to person, place, and time.   Psychiatric:         Mood and Affect: Mood and affect normal.         Behavior: Behavior normal.         Thought Content: Thought content normal.         Judgment: Judgment normal.                        Assessment and Plan    Assessment & Plan  Overweight (BMI 25.0-29.9)  Patient's (Body mass index is 28.34 kg/m².) indicates that they are overweight with health conditions that include none . Weight is improving with treatment. BMI is above average; BMI management plan is completed. We discussed portion control, increasing exercise, and pharmacologic options including increasing wegovy to 1.7mg weekly.  Close f/u to monitor " weight loss. Potential adverse reactions noted to increased dose.     Orders:    Semaglutide-Weight Management 1.7 MG/0.75ML solution auto-injector; Inject 0.75 mL under the skin into the appropriate area as directed 1 (One) Time Per Week.    History of obesity  See above.   Orders:    Semaglutide-Weight Management 1.7 MG/0.75ML solution auto-injector; Inject 0.75 mL under the skin into the appropriate area as directed 1 (One) Time Per Week.             Patient to notify office with any acute concerns or issues.  Patient verbalizes understanding, agrees with plan of care and has no further questions upon discharge.    Please note that portions of this note were completed with a voice recognition program.      Follow Up    Return in about 3 months (around 7/28/2025) for Recheck.  Patient was given instructions and counseling regarding her condition or for health maintenance advice. Please see specific information pulled into the AVS if appropriate.       Current Outpatient Medications:     colestipol (COLESTID) 1 g tablet, Take 2 tablets by mouth 2 (Two) Times a Day. (Patient taking differently: Take 2 tablets by mouth Daily As Needed.), Disp: 360 tablet, Rfl: 2    multivitamin with minerals tablet tablet, Take 1 tablet by mouth Daily., Disp: , Rfl:     tacrolimus (PROTOPIC) 0.1 % ointment, Apply  topically to the appropriate area as directed Daily., Disp: , Rfl:     Semaglutide-Weight Management 1.7 MG/0.75ML solution auto-injector, Inject 0.75 mL under the skin into the appropriate area as directed 1 (One) Time Per Week., Disp: 9 mL, Rfl: 0  Medications Discontinued During This Encounter   Medication Reason    Semaglutide-Weight Management (Wegovy) 1 MG/0.5ML solution auto-injector Dose adjustment

## 2025-04-28 NOTE — ASSESSMENT & PLAN NOTE
Patient's (Body mass index is 28.34 kg/m².) indicates that they are overweight with health conditions that include none . Weight is improving with treatment. BMI is above average; BMI management plan is completed. We discussed portion control, increasing exercise, and pharmacologic options including increasing wegovy to 1.7mg weekly. Close f/u to monitor weight loss. Potential adverse reactions noted to increased dose.     Orders:    Semaglutide-Weight Management 1.7 MG/0.75ML solution auto-injector; Inject 0.75 mL under the skin into the appropriate area as directed 1 (One) Time Per Week.

## 2025-04-28 NOTE — ASSESSMENT & PLAN NOTE
See above.   Orders:    Semaglutide-Weight Management 1.7 MG/0.75ML solution auto-injector; Inject 0.75 mL under the skin into the appropriate area as directed 1 (One) Time Per Week.

## 2025-07-23 DIAGNOSIS — Z86.39 HISTORY OF OBESITY: ICD-10-CM

## 2025-07-23 DIAGNOSIS — E66.3 OVERWEIGHT (BMI 25.0-29.9): ICD-10-CM

## 2025-07-29 ENCOUNTER — OFFICE VISIT (OUTPATIENT)
Dept: FAMILY MEDICINE CLINIC | Age: 50
End: 2025-07-29
Payer: COMMERCIAL

## 2025-07-29 VITALS
DIASTOLIC BLOOD PRESSURE: 77 MMHG | BODY MASS INDEX: 27.75 KG/M2 | OXYGEN SATURATION: 97 % | HEIGHT: 67 IN | TEMPERATURE: 98.5 F | WEIGHT: 176.8 LBS | HEART RATE: 76 BPM | SYSTOLIC BLOOD PRESSURE: 116 MMHG

## 2025-07-29 DIAGNOSIS — Z00.00 ROUTINE GENERAL MEDICAL EXAMINATION AT A HEALTH CARE FACILITY: ICD-10-CM

## 2025-07-29 DIAGNOSIS — E66.3 OVERWEIGHT (BMI 25.0-29.9): Primary | ICD-10-CM

## 2025-07-29 DIAGNOSIS — Z87.898 H/O MOTION SICKNESS: ICD-10-CM

## 2025-07-29 DIAGNOSIS — Z86.39 HISTORY OF OBESITY: ICD-10-CM

## 2025-07-29 PROBLEM — R35.89 POLYURIA: Status: RESOLVED | Noted: 2023-10-16 | Resolved: 2025-07-29

## 2025-07-29 RX ORDER — SCOPOLAMINE 1 MG/3D
1 PATCH, EXTENDED RELEASE TRANSDERMAL
Qty: 10 EACH | Refills: 0 | Status: SHIPPED | OUTPATIENT
Start: 2025-07-29

## 2025-07-29 NOTE — PROGRESS NOTES
"Chief Complaint  Weight Loss    Subjective          Onelia Chauhan presents to [facility] for weight loss follow-up.  Patient is currently on semaglutide 1.7 mg weekly.  Patient was 215 pounds prior to starting Wegovy in December 2023.  Tolerating medication well.  Routinely working out. Goal 150s       Objective   Vital Signs:   Vitals:    07/29/25 1045   BP: 116/77   BP Location: Left arm   Patient Position: Sitting   Cuff Size: Adult   Pulse: 76   Temp: 98.5 °F (36.9 °C)   TempSrc: Temporal   SpO2: 97%   Weight: 80.2 kg (176 lb 12.8 oz)   Height: 170.2 cm (67.01\")       Body mass index is 27.68 kg/m².          Physical Exam  Vitals reviewed.   Constitutional:       General: She is not in acute distress.     Appearance: She is well-developed.   HENT:      Head: Normocephalic and atraumatic.   Eyes:      Conjunctiva/sclera: Conjunctivae normal.      Pupils: Pupils are equal, round, and reactive to light.   Cardiovascular:      Rate and Rhythm: Normal rate and regular rhythm.      Heart sounds: Normal heart sounds. No murmur heard.  Pulmonary:      Effort: Pulmonary effort is normal. No respiratory distress.      Breath sounds: Normal breath sounds.   Skin:     General: Skin is warm and dry.   Neurological:      Mental Status: She is alert and oriented to person, place, and time.   Psychiatric:         Mood and Affect: Mood and affect normal.         Behavior: Behavior normal.         Thought Content: Thought content normal.         Judgment: Judgment normal.                        Assessment and Plan    Assessment & Plan  Overweight (BMI 25.0-29.9)  Patient's (Body mass index is 27.68 kg/m².) indicates that they are overweight with health conditions that include none . Weight is improving with treatment. BMI is above average; BMI management plan is completed. We discussed portion control, increasing exercise, and pharmacologic options including continuing wegovy 1.7mg weekly. "     Orders:    Semaglutide-Weight Management 1.7 MG/0.75ML solution auto-injector; Inject 0.75 mL under the skin into the appropriate area as directed 1 (One) Time Per Week.    History of obesity  See above  Orders:    Semaglutide-Weight Management 1.7 MG/0.75ML solution auto-injector; Inject 0.75 mL under the skin into the appropriate area as directed 1 (One) Time Per Week.    Routine general medical examination at a [facility] care facility  Lab orders placed for next routine follow up.     Orders:    CBC Auto Differential; Future    Comprehensive Metabolic Panel; Future    Lipid Panel; Future    TSH Rfx On Abnormal To Free T4; Future    H/O motion sickness  Patches provided       Other orders    Scopolamine 1 MG/3DAYS patch; Place 1 patch on the skin as directed by provider Every 72 (Seventy-Two) Hours.        Patient to notify office with any acute concerns or issues.  Patient verbalizes understanding, agrees with plan of care and has no further questions upon discharge.    Please note that portions of this note were completed with a voice recognition program.      Follow Up    Return in about 3 months (around 10/29/2025) for Annual physical.  Patient was given instructions and counseling regarding her condition or for health maintenance advice. Please see specific information pulled into the AVS if appropriate.       Current Outpatient Medications:     colestipol (COLESTID) 1 g tablet, Take 2 tablets by mouth 2 (Two) Times a Day. (Patient taking differently: Take 2 tablets by mouth Daily As Needed.), Disp: 360 tablet, Rfl: 2    multivitamin with minerals tablet tablet, Take 1 tablet by mouth Daily., Disp: , Rfl:     Semaglutide-Weight Management 1.7 MG/0.75ML solution auto-injector, Inject 0.75 mL under the skin into the appropriate area as directed 1 (One) Time Per Week., Disp: 9 mL, Rfl: 0    tacrolimus (PROTOPIC) 0.1 % ointment, Apply  topically to the appropriate area as directed Daily., Disp: , Rfl:      Scopolamine 1 MG/3DAYS patch, Place 1 patch on the skin as directed by provider Every 72 (Seventy-Two) Hours., Disp: 10 each, Rfl: 0  Medications Discontinued During This Encounter   Medication Reason    Semaglutide-Weight Management 1.7 MG/0.75ML solution auto-injector Reorder

## 2025-07-29 NOTE — ASSESSMENT & PLAN NOTE
See above  Orders:    Semaglutide-Weight Management 1.7 MG/0.75ML solution auto-injector; Inject 0.75 mL under the skin into the appropriate area as directed 1 (One) Time Per Week.

## 2025-07-29 NOTE — ASSESSMENT & PLAN NOTE
Patient's (Body mass index is 27.68 kg/m².) indicates that they are overweight with health conditions that include none . Weight is improving with treatment. BMI is above average; BMI management plan is completed. We discussed portion control, increasing exercise, and pharmacologic options including continuing wegovy 1.7mg weekly.     Orders:    Semaglutide-Weight Management 1.7 MG/0.75ML solution auto-injector; Inject 0.75 mL under the skin into the appropriate area as directed 1 (One) Time Per Week.